# Patient Record
Sex: FEMALE | Race: WHITE
[De-identification: names, ages, dates, MRNs, and addresses within clinical notes are randomized per-mention and may not be internally consistent; named-entity substitution may affect disease eponyms.]

---

## 2019-02-18 ENCOUNTER — HOSPITAL ENCOUNTER (OUTPATIENT)
Age: 74
End: 2019-02-18
Payer: MEDICARE

## 2019-02-18 DIAGNOSIS — N18.9: ICD-10-CM

## 2019-02-18 DIAGNOSIS — M47.897: ICD-10-CM

## 2019-02-18 DIAGNOSIS — J45.909: ICD-10-CM

## 2019-02-18 DIAGNOSIS — I82.503: ICD-10-CM

## 2019-02-18 DIAGNOSIS — F41.9: ICD-10-CM

## 2019-02-18 DIAGNOSIS — E78.5: ICD-10-CM

## 2019-02-18 DIAGNOSIS — J44.9: ICD-10-CM

## 2019-02-18 DIAGNOSIS — L10.9: ICD-10-CM

## 2019-02-18 DIAGNOSIS — E11.9: ICD-10-CM

## 2019-02-18 DIAGNOSIS — I12.9: ICD-10-CM

## 2019-02-18 DIAGNOSIS — K22.70: ICD-10-CM

## 2019-02-18 DIAGNOSIS — E05.90: ICD-10-CM

## 2019-02-18 DIAGNOSIS — M06.09: Primary | ICD-10-CM

## 2019-02-18 DIAGNOSIS — M79.7: ICD-10-CM

## 2019-02-18 LAB
ALANINE AMINOTRANSFER ALT/SGPT: 26 U/L (ref 13–56)
ALBUMIN SERPL-MCNC: 3.2 G/DL (ref 3.2–5)
ALKALINE PHOSPHATASE: 105 U/L (ref 45–117)
ANION GAP: 15 (ref 5–15)
AST(SGOT): 14 U/L (ref 15–37)
BUN SERPL-MCNC: 29 MG/DL (ref 7–18)
BUN/CREAT RATIO: 10.1 RATIO (ref 10–20)
CALCIUM SERPL-MCNC: 9.1 MG/DL (ref 8.5–10.1)
CARBON DIOXIDE: 24 MMOL/L (ref 21–32)
CHLORIDE: 106 MMOL/L (ref 98–107)
CRP SERPL-MCNC: 17 MG/L (ref 0–3)
DEPRECATED RDW RBC: 52.1 FL (ref 35.1–43.9)
DIFFERENTIAL INDICATED: (no result)
ERYTHROCYTE [DISTWIDTH] IN BLOOD: 15.6 % (ref 11.6–14.6)
EST GLOM FILT RATE - AFR AMER: 21 ML/MIN (ref 60–?)
GLOBULIN: 4.5 G/DL (ref 2.2–4.2)
GLUCOSE: 125 MG/DL (ref 74–106)
HCT VFR BLD AUTO: 35.1 % (ref 37–47)
HEMOGLOBIN: 10.6 G/DL (ref 12–15)
HGB BLD-MCNC: 10.6 G/DL (ref 12–15)
IMMATURE GRANULOCYTES COUNT: 0.04 X10^3/UL (ref 0–0)
MCV RBC: 93.4 FL (ref 81–99)
MEAN CORP HGB CONC: 30.2 G/GL (ref 32–36)
MEAN PLATELET VOL.: 10.6 FL (ref 6.2–12)
PLATELET # BLD: 328 K/MM3 (ref 150–450)
PLATELET COUNT: 328 K/MM3 (ref 150–450)
POSITIVE COUNT: NO
POSITIVE DIFFERENTIAL: NO
POSITIVE MORPHOLOGY: NO
POTASSIUM: 3.2 MMOL/L (ref 3.5–5.1)
RBC # BLD AUTO: 3.76 M/MM3 (ref 4.2–5.4)
RBC DISTRIBUTION WIDTH CV: 15.6 % (ref 11.6–14.6)
RBC DISTRIBUTION WIDTH SD: 52.1 FL (ref 35.1–43.9)
WBC # BLD AUTO: 10.7 K/MM3 (ref 4.4–11)
WHITE BLOOD COUNT: 10.7 K/MM3 (ref 4.4–11)

## 2019-02-18 PROCEDURE — 86803 HEPATITIS C AB TEST: CPT

## 2019-02-18 PROCEDURE — 73030 X-RAY EXAM OF SHOULDER: CPT

## 2019-02-18 PROCEDURE — 87340 HEPATITIS B SURFACE AG IA: CPT

## 2019-02-18 PROCEDURE — 86200 CCP ANTIBODY: CPT

## 2019-02-18 PROCEDURE — 86706 HEP B SURFACE ANTIBODY: CPT

## 2019-02-18 PROCEDURE — 85652 RBC SED RATE AUTOMATED: CPT

## 2019-02-18 PROCEDURE — 86431 RHEUMATOID FACTOR QUANT: CPT

## 2019-02-18 PROCEDURE — 80053 COMPREHEN METABOLIC PANEL: CPT

## 2019-02-18 PROCEDURE — 85025 COMPLETE CBC W/AUTO DIFF WBC: CPT

## 2019-02-18 PROCEDURE — 86140 C-REACTIVE PROTEIN: CPT

## 2019-02-18 PROCEDURE — 36415 COLL VENOUS BLD VENIPUNCTURE: CPT

## 2019-02-21 LAB — HEP C ANTIBODIES: <0.1 S/CO RATIO (ref 0–0.9)

## 2023-04-04 ENCOUNTER — TELEPHONE (OUTPATIENT)
Dept: PRIMARY CARE | Facility: CLINIC | Age: 78
End: 2023-04-04
Payer: MEDICARE

## 2023-04-04 DIAGNOSIS — F32.A DEPRESSION, UNSPECIFIED DEPRESSION TYPE: ICD-10-CM

## 2023-04-04 RX ORDER — TRAZODONE HYDROCHLORIDE 100 MG/1
100 TABLET ORAL NIGHTLY
Qty: 90 TABLET | Refills: 3 | Status: SHIPPED | OUTPATIENT
Start: 2023-04-04 | End: 2023-05-08 | Stop reason: SDUPTHER

## 2023-04-04 RX ORDER — TRAZODONE HYDROCHLORIDE 100 MG/1
100 TABLET ORAL NIGHTLY
COMMUNITY
End: 2023-04-04 | Stop reason: SDUPTHER

## 2023-04-04 RX ORDER — ALLOPURINOL 100 MG/1
1 TABLET ORAL DAILY
COMMUNITY
End: 2023-06-06 | Stop reason: SDUPTHER

## 2023-04-06 ENCOUNTER — TELEPHONE (OUTPATIENT)
Dept: PRIMARY CARE | Facility: CLINIC | Age: 78
End: 2023-04-06
Payer: MEDICARE

## 2023-04-06 DIAGNOSIS — I10 HTN (HYPERTENSION), BENIGN: Primary | ICD-10-CM

## 2023-04-06 RX ORDER — ACETAMINOPHEN 500 MG
1 TABLET ORAL DAILY
COMMUNITY

## 2023-04-06 RX ORDER — ALBUTEROL SULFATE 90 UG/1
2 AEROSOL, METERED RESPIRATORY (INHALATION) EVERY 6 HOURS PRN
COMMUNITY

## 2023-04-06 RX ORDER — POTASSIUM CHLORIDE 750 MG/1
10 TABLET, EXTENDED RELEASE ORAL DAILY
COMMUNITY
End: 2023-06-06 | Stop reason: SDUPTHER

## 2023-04-06 RX ORDER — FLUTICASONE PROPIONATE 250 UG/1
1 POWDER, METERED RESPIRATORY (INHALATION) 2 TIMES DAILY
COMMUNITY
Start: 2020-03-20 | End: 2023-06-06 | Stop reason: SDUPTHER

## 2023-04-06 RX ORDER — ASPIRIN 81 MG/1
81 TABLET ORAL DAILY
COMMUNITY

## 2023-04-06 RX ORDER — APIXABAN 2.5 MG/1
2.5 TABLET, FILM COATED ORAL 2 TIMES DAILY
COMMUNITY
End: 2023-06-06 | Stop reason: SDUPTHER

## 2023-04-06 RX ORDER — FERROUS SULFATE 325(65) MG
1 TABLET ORAL DAILY
COMMUNITY
Start: 2016-12-12

## 2023-04-06 RX ORDER — FUROSEMIDE 40 MG/1
40 TABLET ORAL DAILY
Qty: 90 TABLET | Refills: 3 | Status: SHIPPED | OUTPATIENT
Start: 2023-04-06 | End: 2023-06-06 | Stop reason: SDUPTHER

## 2023-04-06 RX ORDER — FUROSEMIDE 40 MG/1
40 TABLET ORAL DAILY
COMMUNITY
Start: 2022-10-03 | End: 2023-04-06 | Stop reason: SDUPTHER

## 2023-04-06 RX ORDER — METOPROLOL SUCCINATE 50 MG/1
50 TABLET, EXTENDED RELEASE ORAL DAILY
COMMUNITY
End: 2023-06-06 | Stop reason: SDUPTHER

## 2023-04-06 RX ORDER — MIRTAZAPINE 45 MG/1
45 TABLET, FILM COATED ORAL NIGHTLY
COMMUNITY
End: 2023-05-08

## 2023-04-06 RX ORDER — METHIMAZOLE 5 MG/1
5 TABLET ORAL DAILY
COMMUNITY
End: 2023-06-06 | Stop reason: SDUPTHER

## 2023-04-06 RX ORDER — ROSUVASTATIN CALCIUM 20 MG/1
20 TABLET, COATED ORAL NIGHTLY
COMMUNITY
End: 2023-06-06 | Stop reason: SDUPTHER

## 2023-04-06 RX ORDER — BETAMETHASONE DIPROPIONATE 0.5 MG/G
0.05 OINTMENT, AUGMENTED TOPICAL 2 TIMES DAILY
COMMUNITY
Start: 2020-10-15

## 2023-04-06 NOTE — TELEPHONE ENCOUNTER
Patient  called wants to know why  she is taking asa 81 mg and eleiquis ?also was in the hospital in July and never followed up with us.  While in hospital  lasix was decreased to 40mg.  Every day.;

## 2023-04-10 LAB
ALBUMIN (G/DL) IN SER/PLAS: 3.2 G/DL (ref 3.4–5)
ANION GAP IN SER/PLAS: 15 MMOL/L (ref 10–20)
CALCIUM (MG/DL) IN SER/PLAS: 8.9 MG/DL (ref 8.6–10.3)
CARBON DIOXIDE, TOTAL (MMOL/L) IN SER/PLAS: 23 MMOL/L (ref 21–32)
CHLORIDE (MMOL/L) IN SER/PLAS: 107 MMOL/L (ref 98–107)
CREATININE (MG/DL) IN SER/PLAS: 2.51 MG/DL (ref 0.5–1.05)
CREATININE (MG/DL) IN URINE: 64 MG/DL (ref 20–320)
GFR FEMALE: 19 ML/MIN/1.73M2
GLUCOSE (MG/DL) IN SER/PLAS: 109 MG/DL (ref 74–99)
HEMATOCRIT (%) IN BLOOD BY AUTOMATED COUNT: 28.1 % (ref 36–46)
HEMOGLOBIN (G/DL) IN BLOOD: 8.4 G/DL (ref 12–16)
PHOSPHATE (MG/DL) IN SER/PLAS: 4.2 MG/DL (ref 2.5–4.9)
POTASSIUM (MMOL/L) IN SER/PLAS: 3.8 MMOL/L (ref 3.5–5.3)
PROTEIN (MG/DL) IN URINE: 20 MG/DL (ref 5–24)
PROTEIN/CREATININE (MG/MG) IN URINE: 0.31 MG/MG CREAT (ref 0–0.17)
SODIUM (MMOL/L) IN SER/PLAS: 141 MMOL/L (ref 136–145)
URATE (MG/DL) IN SER/PLAS: 6.6 MG/DL (ref 2.3–6.7)
UREA NITROGEN (MG/DL) IN SER/PLAS: 37 MG/DL (ref 6–23)

## 2023-04-11 LAB — PARATHYRIN INTACT (PG/ML) IN SER/PLAS: 139.2 PG/ML (ref 18.5–88)

## 2023-05-08 ENCOUNTER — TELEPHONE (OUTPATIENT)
Dept: PRIMARY CARE | Facility: CLINIC | Age: 78
End: 2023-05-08

## 2023-05-08 ENCOUNTER — OFFICE VISIT (OUTPATIENT)
Dept: PRIMARY CARE | Facility: CLINIC | Age: 78
End: 2023-05-08
Payer: MEDICARE

## 2023-05-08 VITALS
OXYGEN SATURATION: 90 % | HEIGHT: 65 IN | WEIGHT: 175.2 LBS | BODY MASS INDEX: 29.19 KG/M2 | HEART RATE: 91 BPM | DIASTOLIC BLOOD PRESSURE: 58 MMHG | SYSTOLIC BLOOD PRESSURE: 120 MMHG

## 2023-05-08 DIAGNOSIS — J44.89 COPD WITH ASTHMA (MULTI): ICD-10-CM

## 2023-05-08 DIAGNOSIS — M10.9 GOUT, UNSPECIFIED CAUSE, UNSPECIFIED CHRONICITY, UNSPECIFIED SITE: ICD-10-CM

## 2023-05-08 DIAGNOSIS — Z00.00 ROUTINE GENERAL MEDICAL EXAMINATION AT HEALTH CARE FACILITY: Primary | ICD-10-CM

## 2023-05-08 DIAGNOSIS — I26.99 PULMONARY EMBOLISM, UNSPECIFIED CHRONICITY, UNSPECIFIED PULMONARY EMBOLISM TYPE, UNSPECIFIED WHETHER ACUTE COR PULMONALE PRESENT (MULTI): ICD-10-CM

## 2023-05-08 DIAGNOSIS — F32.A DEPRESSION, UNSPECIFIED DEPRESSION TYPE: ICD-10-CM

## 2023-05-08 DIAGNOSIS — E05.90 HYPERTHYROIDISM: ICD-10-CM

## 2023-05-08 DIAGNOSIS — I82.5Y2 CHRONIC DEEP VEIN THROMBOSIS (DVT) OF PROXIMAL VEIN OF LEFT LOWER EXTREMITY (MULTI): ICD-10-CM

## 2023-05-08 DIAGNOSIS — F51.01 PRIMARY INSOMNIA: ICD-10-CM

## 2023-05-08 DIAGNOSIS — D64.9 ANEMIA, UNSPECIFIED TYPE: ICD-10-CM

## 2023-05-08 PROBLEM — Z95.828 S/P INSERTION OF IVC (INFERIOR VENA CAVAL) FILTER: Status: ACTIVE | Noted: 2023-05-08

## 2023-05-08 PROBLEM — R91.1 LUNG NODULE: Status: ACTIVE | Noted: 2023-05-08

## 2023-05-08 PROBLEM — I82.409 DVT, LOWER EXTREMITY (MULTI): Status: ACTIVE | Noted: 2023-05-08

## 2023-05-08 PROBLEM — E11.22 TYPE 2 DIABETES MELLITUS WITH STAGE 4 CHRONIC KIDNEY DISEASE, WITHOUT LONG-TERM CURRENT USE OF INSULIN (MULTI): Status: RESOLVED | Noted: 2019-06-20 | Resolved: 2023-05-08

## 2023-05-08 PROBLEM — E78.00 HYPERCHOLESTEROLEMIA: Status: ACTIVE | Noted: 2023-05-08

## 2023-05-08 PROBLEM — K44.9 HIATAL HERNIA: Status: ACTIVE | Noted: 2023-05-08

## 2023-05-08 PROBLEM — G47.00 INSOMNIA: Status: ACTIVE | Noted: 2023-05-08

## 2023-05-08 PROBLEM — R10.11 RIGHT UPPER QUADRANT ABDOMINAL PAIN: Status: ACTIVE | Noted: 2023-05-08

## 2023-05-08 PROBLEM — N18.4 STAGE 4 CHRONIC KIDNEY DISEASE (MULTI): Status: ACTIVE | Noted: 2023-05-08

## 2023-05-08 PROBLEM — I10 HTN (HYPERTENSION): Status: ACTIVE | Noted: 2023-05-08

## 2023-05-08 PROBLEM — L10.9 PEMPHIGUS (MULTI): Status: ACTIVE | Noted: 2023-05-08

## 2023-05-08 PROBLEM — E11.22 TYPE 2 DIABETES MELLITUS WITH STAGE 4 CHRONIC KIDNEY DISEASE, WITHOUT LONG-TERM CURRENT USE OF INSULIN (MULTI): Status: ACTIVE | Noted: 2019-06-20

## 2023-05-08 PROBLEM — A41.9 SEPSIS (MULTI): Status: RESOLVED | Noted: 2023-05-08 | Resolved: 2023-05-08

## 2023-05-08 PROBLEM — L40.9 PSORIASIS: Status: ACTIVE | Noted: 2023-05-08

## 2023-05-08 PROBLEM — R06.09 DOE (DYSPNEA ON EXERTION): Status: ACTIVE | Noted: 2018-10-04

## 2023-05-08 PROBLEM — N18.4 TYPE 2 DIABETES MELLITUS WITH STAGE 4 CHRONIC KIDNEY DISEASE, WITHOUT LONG-TERM CURRENT USE OF INSULIN (MULTI): Status: RESOLVED | Noted: 2019-06-20 | Resolved: 2023-05-08

## 2023-05-08 PROBLEM — K22.70 BARRETT'S ESOPHAGUS: Status: ACTIVE | Noted: 2023-05-08

## 2023-05-08 PROBLEM — F41.1 GENERALIZED ANXIETY DISORDER: Status: ACTIVE | Noted: 2023-05-08

## 2023-05-08 PROBLEM — K21.9 ACID REFLUX: Status: ACTIVE | Noted: 2023-05-08

## 2023-05-08 PROBLEM — A41.9 SEPSIS (MULTI): Status: ACTIVE | Noted: 2023-05-08

## 2023-05-08 PROBLEM — Z86.718 PERSONAL HISTORY OF DVT (DEEP VEIN THROMBOSIS): Status: ACTIVE | Noted: 2019-06-20

## 2023-05-08 PROBLEM — E66.9 OBESITY (BMI 30-39.9): Status: ACTIVE | Noted: 2019-06-21

## 2023-05-08 PROBLEM — J45.909 ASTHMA (HHS-HCC): Status: ACTIVE | Noted: 2023-05-08

## 2023-05-08 PROBLEM — I10 BENIGN ESSENTIAL HTN: Status: ACTIVE | Noted: 2023-05-08

## 2023-05-08 PROBLEM — N18.4 TYPE 2 DIABETES MELLITUS WITH STAGE 4 CHRONIC KIDNEY DISEASE, WITHOUT LONG-TERM CURRENT USE OF INSULIN (MULTI): Status: ACTIVE | Noted: 2019-06-20

## 2023-05-08 PROCEDURE — 99214 OFFICE O/P EST MOD 30 MIN: CPT | Performed by: FAMILY MEDICINE

## 2023-05-08 PROCEDURE — 3074F SYST BP LT 130 MM HG: CPT | Performed by: FAMILY MEDICINE

## 2023-05-08 PROCEDURE — 1159F MED LIST DOCD IN RCRD: CPT | Performed by: FAMILY MEDICINE

## 2023-05-08 PROCEDURE — G0439 PPPS, SUBSEQ VISIT: HCPCS | Performed by: FAMILY MEDICINE

## 2023-05-08 PROCEDURE — 3078F DIAST BP <80 MM HG: CPT | Performed by: FAMILY MEDICINE

## 2023-05-08 PROCEDURE — 1160F RVW MEDS BY RX/DR IN RCRD: CPT | Performed by: FAMILY MEDICINE

## 2023-05-08 PROCEDURE — 1170F FXNL STATUS ASSESSED: CPT | Performed by: FAMILY MEDICINE

## 2023-05-08 PROCEDURE — 1036F TOBACCO NON-USER: CPT | Performed by: FAMILY MEDICINE

## 2023-05-08 RX ORDER — TRAZODONE HYDROCHLORIDE 100 MG/1
200 TABLET ORAL NIGHTLY
Qty: 180 TABLET | Refills: 3 | Status: SHIPPED | OUTPATIENT
Start: 2023-05-08 | End: 2024-06-07 | Stop reason: SDUPTHER

## 2023-05-08 ASSESSMENT — ACTIVITIES OF DAILY LIVING (ADL)
GROCERY_SHOPPING: INDEPENDENT
TAKING_MEDICATION: INDEPENDENT
DRESSING: INDEPENDENT
DOING_HOUSEWORK: INDEPENDENT
BATHING: INDEPENDENT
MANAGING_FINANCES: INDEPENDENT

## 2023-05-08 ASSESSMENT — PATIENT HEALTH QUESTIONNAIRE - PHQ9
2. FEELING DOWN, DEPRESSED OR HOPELESS: NOT AT ALL
1. LITTLE INTEREST OR PLEASURE IN DOING THINGS: NOT AT ALL
SUM OF ALL RESPONSES TO PHQ9 QUESTIONS 1 AND 2: 0

## 2023-05-08 ASSESSMENT — ENCOUNTER SYMPTOMS
OCCASIONAL FEELINGS OF UNSTEADINESS: 0
LOSS OF SENSATION IN FEET: 0
DEPRESSION: 0

## 2023-05-08 NOTE — PROGRESS NOTES
"Subjective   Reason for Visit: Hailee David is an 78 y.o. female here for a Medicare Wellness visit.     Past Medical, Surgical, and Family History reviewed and updated in chart.    Reviewed all medications by prescribing practitioner or clinical pharmacist (such as prescriptions, OTCs, herbal therapies and supplements) and documented in the medical record.    HPI  I insomnia  is not sleeping wellon both remeron and mirtaz reviewed that she does not have any urinary symptoms or bone pains that keep her up at night but is the brain will not shut off.  Reviewed this is anxiety.  She has been on mirtazapine 45 and trazodone 100.  She states she still stays up most nights.  We will discontinue mirtazapine and increase trazodone to 200 mg daily  Copd. No flovent use, prn albuterol 4-5x a month.  No exacerbations since last visit  Renal recc I refer to heme onc for persisting anemia.  While I do not think this is an anemia of chronic disease or due to renal disease will have to be evaluated by heme-onc.  Reviewed labs from last year showing a B12 at the lower end of normal hemoglobin 8.4 B12 280 haptoglobin 369 ferritin 197.  Transferrin 226 iron 23 TIBC 143% sat 16  hyperthyroid with most recent TSH showing 0.14 and free thyroxine of slightly elevated 1.2.  We will recheck this at this time.  Some methimazole 5 mg daily  History of DVT with a filter and PE maintain an anticoagulation  Gout- no interval exacerbations, continues on prophylactic therapy without side effects.  Hyperlipidemia- is on a statin and a prudent diet.    Patient Care Team:  Devon Lewis MD as PCP - General  Devon Lewis MD as PCP - [employer] PCP     Review of Systems as per HPI    Objective   Vitals:  /58   Pulse 91   Ht 1.651 m (5' 5\")   Wt 79.5 kg (175 lb 3.2 oz)   SpO2 90%   BMI 29.15 kg/m²       Physical Exam  General:  Alert, No acute distress. Appears stated age  Eye:  Pupils are equal, round and reactive " to light, Extraocular movements are intact, Normal conjunctiva.    Neck:  Supple, Non-tender, No carotid bruit, No jugular venous distention, No lymphadenopathy, No thyromegaly.    Respiratory:  Lungs are clear to auscultation, Respirations are non-labored, Breath sounds are equal.    Cardiovascular:  Normal rate, Regular rhythm, No murmur.    Gastrointestinal:  Soft, Non-tender, No organomegaly. No solid or pulsatile mass  Integumentary:  Warm, Dry. No concerning lesions on exposed areas  Neurologic:  Alert, Oriented.  Gross and fine motor intact, CN 2-12 intact  Psychiatric:  Cooperative, Appropriate mood & affect.  Assessment/Plan   Problem List Items Addressed This Visit          Nervous    Insomnia    Relevant Medications    traZODone (Desyrel) 100 mg tablet    Other Relevant Orders    Follow Up In Primary Care       Respiratory    COPD with asthma (CMS/HCC)    Relevant Orders    Follow Up In Primary Care       Circulatory    DVT, lower extremity (CMS/HCC)    Relevant Orders    Follow Up In Primary Care    Pulmonary embolism (CMS/HCC)    Relevant Orders    Follow Up In Primary Care       Endocrine/Metabolic    Hyperthyroidism    Relevant Orders    Thyroxine, Free    Triiodothyronine, Free    Thyroid Stimulating Hormone    Thyroid Peroxidase (TPO) Antibody       Hematologic    Anemia    Relevant Orders    Referral to Hematology    Follow Up In Primary Care       Other    Depression    Relevant Medications    traZODone (Desyrel) 100 mg tablet    Other Relevant Orders    Follow Up In Primary Care    Gout     Other Visit Diagnoses       Routine general medical examination at health care facility    -  Primary

## 2023-06-06 DIAGNOSIS — I25.10 CORONARY ARTERY DISEASE, UNSPECIFIED VESSEL OR LESION TYPE, UNSPECIFIED WHETHER ANGINA PRESENT, UNSPECIFIED WHETHER NATIVE OR TRANSPLANTED HEART: ICD-10-CM

## 2023-06-06 DIAGNOSIS — M10.9 GOUT, UNSPECIFIED CAUSE, UNSPECIFIED CHRONICITY, UNSPECIFIED SITE: ICD-10-CM

## 2023-06-06 DIAGNOSIS — J44.89 COPD WITH ASTHMA (MULTI): ICD-10-CM

## 2023-06-06 DIAGNOSIS — I82.5Y2 CHRONIC DEEP VEIN THROMBOSIS (DVT) OF PROXIMAL VEIN OF LEFT LOWER EXTREMITY (MULTI): ICD-10-CM

## 2023-06-06 DIAGNOSIS — I10 HTN (HYPERTENSION), BENIGN: ICD-10-CM

## 2023-06-06 DIAGNOSIS — E05.90 HYPERTHYROIDISM: ICD-10-CM

## 2023-06-06 RX ORDER — FLUTICASONE PROPIONATE 250 UG/1
1 POWDER, METERED RESPIRATORY (INHALATION)
Qty: 60 EACH | Refills: 3 | Status: SHIPPED | OUTPATIENT
Start: 2023-06-06

## 2023-06-06 RX ORDER — POTASSIUM CHLORIDE 750 MG/1
10 TABLET, EXTENDED RELEASE ORAL DAILY
Qty: 90 TABLET | Refills: 3 | Status: SHIPPED | OUTPATIENT
Start: 2023-06-06 | End: 2024-02-26 | Stop reason: SDUPTHER

## 2023-06-06 RX ORDER — FUROSEMIDE 40 MG/1
40 TABLET ORAL DAILY
Qty: 90 TABLET | Refills: 3 | Status: SHIPPED | OUTPATIENT
Start: 2023-06-06 | End: 2024-06-07 | Stop reason: SDUPTHER

## 2023-06-06 RX ORDER — ROSUVASTATIN CALCIUM 20 MG/1
20 TABLET, COATED ORAL NIGHTLY
Qty: 90 TABLET | Refills: 3 | Status: SHIPPED | OUTPATIENT
Start: 2023-06-06 | End: 2024-02-26 | Stop reason: SDUPTHER

## 2023-06-06 RX ORDER — ALLOPURINOL 100 MG/1
100 TABLET ORAL DAILY
Qty: 90 TABLET | Refills: 3 | Status: SHIPPED | OUTPATIENT
Start: 2023-06-06 | End: 2024-02-26 | Stop reason: SDUPTHER

## 2023-06-06 RX ORDER — APIXABAN 2.5 MG/1
2.5 TABLET, FILM COATED ORAL 2 TIMES DAILY
Qty: 180 TABLET | Refills: 3 | Status: SHIPPED | OUTPATIENT
Start: 2023-06-06 | End: 2023-10-02 | Stop reason: SDUPTHER

## 2023-06-06 RX ORDER — METHIMAZOLE 5 MG/1
5 TABLET ORAL DAILY
Qty: 90 TABLET | Refills: 3 | Status: SHIPPED | OUTPATIENT
Start: 2023-06-06 | End: 2024-02-26 | Stop reason: SDUPTHER

## 2023-06-06 RX ORDER — METOPROLOL SUCCINATE 50 MG/1
50 TABLET, EXTENDED RELEASE ORAL DAILY
Qty: 90 TABLET | Refills: 3 | Status: SHIPPED | OUTPATIENT
Start: 2023-06-06 | End: 2024-02-26 | Stop reason: SDUPTHER

## 2023-08-15 ENCOUNTER — APPOINTMENT (OUTPATIENT)
Dept: PRIMARY CARE | Facility: CLINIC | Age: 78
End: 2023-08-15
Payer: MEDICARE

## 2023-08-22 ENCOUNTER — LAB (OUTPATIENT)
Dept: LAB | Facility: LAB | Age: 78
End: 2023-08-22
Payer: MEDICARE

## 2023-08-22 DIAGNOSIS — E05.90 HYPERTHYROIDISM: ICD-10-CM

## 2023-08-22 LAB
ALANINE AMINOTRANSFERASE (SGPT) (U/L) IN SER/PLAS: 6 U/L (ref 7–45)
ALBUMIN (G/DL) IN SER/PLAS: 3.1 G/DL (ref 3.4–5)
ALKALINE PHOSPHATASE (U/L) IN SER/PLAS: 84 U/L (ref 33–136)
ANION GAP IN SER/PLAS: 16 MMOL/L (ref 10–20)
ASPARTATE AMINOTRANSFERASE (SGOT) (U/L) IN SER/PLAS: 11 U/L (ref 9–39)
BILIRUBIN TOTAL (MG/DL) IN SER/PLAS: 0.3 MG/DL (ref 0–1.2)
CALCIUM (MG/DL) IN SER/PLAS: 8.6 MG/DL (ref 8.6–10.3)
CARBON DIOXIDE, TOTAL (MMOL/L) IN SER/PLAS: 23 MMOL/L (ref 21–32)
CHLORIDE (MMOL/L) IN SER/PLAS: 105 MMOL/L (ref 98–107)
CREATININE (MG/DL) IN SER/PLAS: 2.24 MG/DL (ref 0.5–1.05)
CREATININE (MG/DL) IN URINE: 133 MG/DL (ref 20–320)
GFR FEMALE: 22 ML/MIN/1.73M2
GLUCOSE (MG/DL) IN SER/PLAS: 131 MG/DL (ref 74–99)
HEMATOCRIT (%) IN BLOOD BY AUTOMATED COUNT: 28.1 % (ref 36–46)
HEMOGLOBIN (G/DL) IN BLOOD: 8.5 G/DL (ref 12–16)
POTASSIUM (MMOL/L) IN SER/PLAS: 3.7 MMOL/L (ref 3.5–5.3)
PROTEIN (MG/DL) IN URINE: 56 MG/DL (ref 5–24)
PROTEIN TOTAL: 6.9 G/DL (ref 6.4–8.2)
PROTEIN/CREATININE (MG/MG) IN URINE: 0.42 MG/MG CREAT (ref 0–0.17)
SODIUM (MMOL/L) IN SER/PLAS: 140 MMOL/L (ref 136–145)
THYROTROPIN (MIU/L) IN SER/PLAS BY DETECTION LIMIT <= 0.05 MIU/L: 0.98 MIU/L (ref 0.44–3.98)
THYROXINE (T4) FREE (NG/DL) IN SER/PLAS: 0.98 NG/DL (ref 0.61–1.12)
URATE (MG/DL) IN SER/PLAS: 6.6 MG/DL (ref 2.3–6.7)
UREA NITROGEN (MG/DL) IN SER/PLAS: 20 MG/DL (ref 6–23)

## 2023-08-22 PROCEDURE — 84439 ASSAY OF FREE THYROXINE: CPT

## 2023-08-22 PROCEDURE — 84481 FREE ASSAY (FT-3): CPT

## 2023-08-22 PROCEDURE — 36415 COLL VENOUS BLD VENIPUNCTURE: CPT

## 2023-08-22 PROCEDURE — 84443 ASSAY THYROID STIM HORMONE: CPT

## 2023-08-22 PROCEDURE — 86376 MICROSOMAL ANTIBODY EACH: CPT

## 2023-08-23 LAB
PARATHYRIN INTACT (PG/ML) IN SER/PLAS: 130.1 PG/ML (ref 18.5–88)
THYROPEROXIDASE AB (IU/ML) IN SER/PLAS: 38 IU/ML
TRIIODOTHYRONINE (T3) FREE (PG/ML) IN SER/PLAS: 2.7 PG/ML (ref 2.3–4.2)

## 2023-09-18 ENCOUNTER — OFFICE VISIT (OUTPATIENT)
Dept: PRIMARY CARE | Facility: CLINIC | Age: 78
End: 2023-09-18
Payer: MEDICARE

## 2023-09-18 VITALS
DIASTOLIC BLOOD PRESSURE: 68 MMHG | HEART RATE: 96 BPM | BODY MASS INDEX: 26.61 KG/M2 | HEIGHT: 65 IN | SYSTOLIC BLOOD PRESSURE: 138 MMHG | WEIGHT: 159.7 LBS | OXYGEN SATURATION: 98 %

## 2023-09-18 DIAGNOSIS — I10 PRIMARY HYPERTENSION: ICD-10-CM

## 2023-09-18 DIAGNOSIS — M10.9 GOUT, UNSPECIFIED CAUSE, UNSPECIFIED CHRONICITY, UNSPECIFIED SITE: Primary | ICD-10-CM

## 2023-09-18 DIAGNOSIS — E05.90 HYPERTHYROIDISM: ICD-10-CM

## 2023-09-18 DIAGNOSIS — F41.1 GENERALIZED ANXIETY DISORDER: ICD-10-CM

## 2023-09-18 DIAGNOSIS — J44.89 COPD WITH ASTHMA (MULTI): ICD-10-CM

## 2023-09-18 DIAGNOSIS — F32.5 MAJOR DEPRESSIVE DISORDER IN FULL REMISSION, UNSPECIFIED WHETHER RECURRENT (CMS-HCC): ICD-10-CM

## 2023-09-18 DIAGNOSIS — F51.01 PRIMARY INSOMNIA: ICD-10-CM

## 2023-09-18 DIAGNOSIS — K21.9 GASTROESOPHAGEAL REFLUX DISEASE WITHOUT ESOPHAGITIS: ICD-10-CM

## 2023-09-18 PROCEDURE — 3078F DIAST BP <80 MM HG: CPT | Performed by: FAMILY MEDICINE

## 2023-09-18 PROCEDURE — 3075F SYST BP GE 130 - 139MM HG: CPT | Performed by: FAMILY MEDICINE

## 2023-09-18 PROCEDURE — 1160F RVW MEDS BY RX/DR IN RCRD: CPT | Performed by: FAMILY MEDICINE

## 2023-09-18 PROCEDURE — 1159F MED LIST DOCD IN RCRD: CPT | Performed by: FAMILY MEDICINE

## 2023-09-18 PROCEDURE — 1036F TOBACCO NON-USER: CPT | Performed by: FAMILY MEDICINE

## 2023-09-18 PROCEDURE — 99214 OFFICE O/P EST MOD 30 MIN: CPT | Performed by: FAMILY MEDICINE

## 2023-09-18 RX ORDER — CALCITRIOL 0.25 UG/1
0.25 CAPSULE ORAL DAILY
COMMUNITY
Start: 2023-09-04

## 2023-09-18 RX ORDER — PANTOPRAZOLE SODIUM 20 MG/1
20 TABLET, DELAYED RELEASE ORAL DAILY
COMMUNITY
Start: 2023-09-04 | End: 2023-10-02 | Stop reason: SDUPTHER

## 2023-09-18 NOTE — PROGRESS NOTES
"Subjective   Patient ID: Hailee David is a 78 y.o. female who presents for Follow-up (3 mo).    HPI   Since the last office visit there have been no interval operations, hospitalizations, important illnesses or injuries.  Gout- no interval exacerbations, continues on prophylactic therapy without side effects.  Hyperlipidemia- is on a statin and a prudent diet.  copd- no exacerbations since last ov. Albuterol 2x month , nno flovoent in months  GERD-Takes PPI daily with no breakthrough symptoms.  Reviewed dietary, caffeine, tobacco, alcohol, and NSAID avoidance. No dyspepsia, dysphagia, reflux, melena, or abdominal pain.  Insomnia -went backto 100as side effects at 200  One trip to er for heart jumping ojut of chest  Sees dr beasley for renal  Missed appt [facility], records show ?[facility]  Anx/dep- on trazodone. No breakthru symptoms  On methimisole for hyper thy and at goal  Review of Systems  General-no fatigue weight to within 10 pounds  ENT no problems with vision swallowing  Cardiac no chest pains palpitations change in exercise tolerance or capacity  Pulmonary no cough shortness of breath  GI no heartburn or abdominal pain  Musculoskeletal no joint pains  Objective   /68   Pulse 96   Ht 1.651 m (5' 5\")   Wt 72.4 kg (159 lb 11.2 oz)   SpO2 98%   BMI 26.58 kg/m²     Physical Exam  General:  Alert, No acute distress. Appears stated age  Eye:  Pupils are equal, round and reactive to light, Extraocular movements are intact, Normal conjunctiva.    Neck:  Supple, Non-tender, No carotid bruit, No jugular venous distention, No lymphadenopathy, No thyromegaly.    Respiratory:  Lungs are clear to auscultation, Respirations are non-labored, Breath sounds are equal.    Cardiovascular:  Normal rate, Regular rhythm, No murmur.    Gastrointestinal:  Soft, Non-tender, No organomegaly. No solid or pulsatile mass  Integumentary:  Warm, Dry. No concerning lesions on exposed areas  Neurologic:  Alert, Oriented.  Gross and fine " motor intact, CN 2-12 intact  Psychiatric:  Cooperative, Appropriate mood & affect.  Assessment/Plan   Problem List Items Addressed This Visit       Acid reflux    Relevant Orders    Follow Up In Primary Care    COPD with asthma (CMS/HCC)    Relevant Orders    Follow Up In Primary Care    Depression    Relevant Orders    Follow Up In Primary Care    Generalized anxiety disorder    Relevant Orders    Follow Up In Primary Care    Gout - Primary    Relevant Orders    Follow Up In Primary Care    HTN (hypertension)    Relevant Orders    Follow Up In Primary Care    CBC    Comprehensive Metabolic Panel    Lipid Panel    Hyperthyroidism    Relevant Orders    Follow Up In Primary Care    CBC    Comprehensive Metabolic Panel    Lipid Panel    Thyroid Stimulating Hormone    Insomnia    Relevant Orders    Follow Up In Primary Care

## 2023-10-02 DIAGNOSIS — K21.9 GASTROESOPHAGEAL REFLUX DISEASE WITHOUT ESOPHAGITIS: ICD-10-CM

## 2023-10-02 DIAGNOSIS — I82.5Y2 CHRONIC DEEP VEIN THROMBOSIS (DVT) OF PROXIMAL VEIN OF LEFT LOWER EXTREMITY (MULTI): ICD-10-CM

## 2023-10-02 RX ORDER — PANTOPRAZOLE SODIUM 20 MG/1
20 TABLET, DELAYED RELEASE ORAL DAILY
Qty: 90 TABLET | Refills: 3 | Status: SHIPPED | OUTPATIENT
Start: 2023-10-02 | End: 2024-10-01

## 2023-10-02 RX ORDER — APIXABAN 2.5 MG/1
2.5 TABLET, FILM COATED ORAL 2 TIMES DAILY
Qty: 180 TABLET | Refills: 3 | Status: SHIPPED | OUTPATIENT
Start: 2023-10-02

## 2023-11-29 DIAGNOSIS — N18.4 CHRONIC KIDNEY DISEASE, STAGE 4 (SEVERE) (MULTI): Primary | ICD-10-CM

## 2023-12-21 DIAGNOSIS — J44.89 COPD WITH ASTHMA (MULTI): ICD-10-CM

## 2024-02-26 DIAGNOSIS — I25.10 CORONARY ARTERY DISEASE, UNSPECIFIED VESSEL OR LESION TYPE, UNSPECIFIED WHETHER ANGINA PRESENT, UNSPECIFIED WHETHER NATIVE OR TRANSPLANTED HEART: ICD-10-CM

## 2024-02-26 DIAGNOSIS — E05.90 HYPERTHYROIDISM: ICD-10-CM

## 2024-02-26 DIAGNOSIS — I10 HTN (HYPERTENSION), BENIGN: ICD-10-CM

## 2024-02-26 DIAGNOSIS — M10.9 GOUT, UNSPECIFIED CAUSE, UNSPECIFIED CHRONICITY, UNSPECIFIED SITE: ICD-10-CM

## 2024-02-26 RX ORDER — POTASSIUM CHLORIDE 750 MG/1
10 TABLET, FILM COATED, EXTENDED RELEASE ORAL DAILY
Qty: 90 TABLET | Refills: 3 | Status: SHIPPED | OUTPATIENT
Start: 2024-02-26

## 2024-02-26 RX ORDER — METHIMAZOLE 5 MG/1
5 TABLET ORAL DAILY
Qty: 90 TABLET | Refills: 3 | Status: SHIPPED | OUTPATIENT
Start: 2024-02-26

## 2024-02-26 RX ORDER — ALLOPURINOL 100 MG/1
100 TABLET ORAL DAILY
Qty: 90 TABLET | Refills: 3 | Status: SHIPPED | OUTPATIENT
Start: 2024-02-26

## 2024-02-26 RX ORDER — ROSUVASTATIN CALCIUM 20 MG/1
20 TABLET, COATED ORAL NIGHTLY
Qty: 90 TABLET | Refills: 3 | Status: SHIPPED | OUTPATIENT
Start: 2024-02-26

## 2024-02-26 RX ORDER — METOPROLOL SUCCINATE 50 MG/1
50 TABLET, EXTENDED RELEASE ORAL DAILY
Qty: 90 TABLET | Refills: 3 | Status: SHIPPED | OUTPATIENT
Start: 2024-02-26

## 2024-03-13 ENCOUNTER — LAB (OUTPATIENT)
Dept: LAB | Facility: LAB | Age: 79
End: 2024-03-13
Payer: MEDICARE

## 2024-03-13 DIAGNOSIS — I10 PRIMARY HYPERTENSION: ICD-10-CM

## 2024-03-13 DIAGNOSIS — E05.90 HYPERTHYROIDISM: ICD-10-CM

## 2024-03-13 DIAGNOSIS — N18.4 CHRONIC KIDNEY DISEASE, STAGE 4 (SEVERE) (MULTI): ICD-10-CM

## 2024-03-13 LAB
ALBUMIN SERPL BCP-MCNC: 3.3 G/DL (ref 3.4–5)
ALP SERPL-CCNC: 79 U/L (ref 33–136)
ALT SERPL W P-5'-P-CCNC: 6 U/L (ref 7–45)
ANION GAP SERPL CALC-SCNC: 16 MMOL/L (ref 10–20)
AST SERPL W P-5'-P-CCNC: 14 U/L (ref 9–39)
BILIRUB SERPL-MCNC: 0.3 MG/DL (ref 0–1.2)
BUN SERPL-MCNC: 38 MG/DL (ref 6–23)
CALCIUM SERPL-MCNC: 8.9 MG/DL (ref 8.6–10.3)
CHLORIDE SERPL-SCNC: 104 MMOL/L (ref 98–107)
CO2 SERPL-SCNC: 25 MMOL/L (ref 21–32)
CREAT SERPL-MCNC: 2.33 MG/DL (ref 0.5–1.05)
EGFRCR SERPLBLD CKD-EPI 2021: 21 ML/MIN/1.73M*2
ERYTHROCYTE [DISTWIDTH] IN BLOOD BY AUTOMATED COUNT: 13.9 % (ref 11.5–14.5)
GLUCOSE SERPL-MCNC: 84 MG/DL (ref 74–99)
HCT VFR BLD AUTO: 28.6 % (ref 36–46)
HGB BLD-MCNC: 8.9 G/DL (ref 12–16)
MCH RBC QN AUTO: 27.2 PG (ref 26–34)
MCHC RBC AUTO-ENTMCNC: 31.1 G/DL (ref 32–36)
MCV RBC AUTO: 88 FL (ref 80–100)
NRBC BLD-RTO: 0 /100 WBCS (ref 0–0)
PLATELET # BLD AUTO: 280 X10*3/UL (ref 150–450)
POTASSIUM SERPL-SCNC: 4.1 MMOL/L (ref 3.5–5.3)
PROT SERPL-MCNC: 7 G/DL (ref 6.4–8.2)
RBC # BLD AUTO: 3.27 X10*6/UL (ref 4–5.2)
SODIUM SERPL-SCNC: 141 MMOL/L (ref 136–145)
TSH SERPL-ACNC: 1.51 MIU/L (ref 0.44–3.98)
URATE SERPL-MCNC: 6.2 MG/DL (ref 2.3–6.7)
WBC # BLD AUTO: 6.7 X10*3/UL (ref 4.4–11.3)

## 2024-03-13 PROCEDURE — 84443 ASSAY THYROID STIM HORMONE: CPT

## 2024-03-13 PROCEDURE — 83970 ASSAY OF PARATHORMONE: CPT

## 2024-03-13 PROCEDURE — 80053 COMPREHEN METABOLIC PANEL: CPT

## 2024-03-13 PROCEDURE — 36415 COLL VENOUS BLD VENIPUNCTURE: CPT

## 2024-03-13 PROCEDURE — 84550 ASSAY OF BLOOD/URIC ACID: CPT

## 2024-03-13 PROCEDURE — 85027 COMPLETE CBC AUTOMATED: CPT

## 2024-03-14 ENCOUNTER — LAB (OUTPATIENT)
Dept: LAB | Facility: LAB | Age: 79
End: 2024-03-14
Payer: MEDICARE

## 2024-03-14 DIAGNOSIS — I10 PRIMARY HYPERTENSION: ICD-10-CM

## 2024-03-14 DIAGNOSIS — E05.90 HYPERTHYROIDISM: ICD-10-CM

## 2024-03-14 DIAGNOSIS — D47.2 GAMMOPATHY: ICD-10-CM

## 2024-03-14 LAB
CHOLEST SERPL-MCNC: 136 MG/DL (ref 0–199)
CHOLESTEROL/HDL RATIO: 3
HDLC SERPL-MCNC: 46 MG/DL
LDLC SERPL CALC-MCNC: 69 MG/DL
NON HDL CHOLESTEROL: 90 MG/DL (ref 0–149)
PTH-INTACT SERPL-MCNC: 104.9 PG/ML (ref 18.5–88)
TRIGL SERPL-MCNC: 106 MG/DL (ref 0–149)
VLDL: 21 MG/DL (ref 0–40)

## 2024-03-14 PROCEDURE — 84165 PROTEIN E-PHORESIS SERUM: CPT

## 2024-03-14 PROCEDURE — 36415 COLL VENOUS BLD VENIPUNCTURE: CPT

## 2024-03-14 PROCEDURE — 80061 LIPID PANEL: CPT

## 2024-03-14 PROCEDURE — 84165 PROTEIN E-PHORESIS SERUM: CPT | Performed by: FAMILY MEDICINE

## 2024-03-18 ENCOUNTER — OFFICE VISIT (OUTPATIENT)
Dept: PRIMARY CARE | Facility: CLINIC | Age: 79
End: 2024-03-18
Payer: MEDICARE

## 2024-03-18 VITALS
SYSTOLIC BLOOD PRESSURE: 166 MMHG | OXYGEN SATURATION: 99 % | DIASTOLIC BLOOD PRESSURE: 62 MMHG | HEIGHT: 65 IN | HEART RATE: 89 BPM | BODY MASS INDEX: 28.57 KG/M2 | WEIGHT: 171.5 LBS

## 2024-03-18 DIAGNOSIS — J44.89 COPD WITH ASTHMA (MULTI): ICD-10-CM

## 2024-03-18 DIAGNOSIS — Z12.31 BREAST CANCER SCREENING BY MAMMOGRAM: Primary | ICD-10-CM

## 2024-03-18 DIAGNOSIS — F51.01 PRIMARY INSOMNIA: ICD-10-CM

## 2024-03-18 DIAGNOSIS — F41.1 GENERALIZED ANXIETY DISORDER: ICD-10-CM

## 2024-03-18 DIAGNOSIS — E05.90 HYPERTHYROIDISM: ICD-10-CM

## 2024-03-18 DIAGNOSIS — M10.9 GOUT, UNSPECIFIED CAUSE, UNSPECIFIED CHRONICITY, UNSPECIFIED SITE: ICD-10-CM

## 2024-03-18 DIAGNOSIS — D47.2 GAMMOPATHY: ICD-10-CM

## 2024-03-18 DIAGNOSIS — F32.5 MAJOR DEPRESSIVE DISORDER IN FULL REMISSION, UNSPECIFIED WHETHER RECURRENT (CMS-HCC): ICD-10-CM

## 2024-03-18 DIAGNOSIS — I10 PRIMARY HYPERTENSION: ICD-10-CM

## 2024-03-18 DIAGNOSIS — K21.9 GASTROESOPHAGEAL REFLUX DISEASE WITHOUT ESOPHAGITIS: ICD-10-CM

## 2024-03-18 PROCEDURE — 3078F DIAST BP <80 MM HG: CPT | Performed by: FAMILY MEDICINE

## 2024-03-18 PROCEDURE — 3077F SYST BP >= 140 MM HG: CPT | Performed by: FAMILY MEDICINE

## 2024-03-18 PROCEDURE — 1160F RVW MEDS BY RX/DR IN RCRD: CPT | Performed by: FAMILY MEDICINE

## 2024-03-18 PROCEDURE — 1036F TOBACCO NON-USER: CPT | Performed by: FAMILY MEDICINE

## 2024-03-18 PROCEDURE — 1159F MED LIST DOCD IN RCRD: CPT | Performed by: FAMILY MEDICINE

## 2024-03-18 PROCEDURE — 99214 OFFICE O/P EST MOD 30 MIN: CPT | Performed by: FAMILY MEDICINE

## 2024-03-18 NOTE — PROGRESS NOTES
"Subjective   Patient ID: Hailee David is a 78 y.o. female who presents for Follow-up (6 mo rev labs).    HPI   States today that her left breast is larger than her right, not sure if that's been the case, some pain, no mass  Some burning dyspepsia froepigastric into chest.  No relief with ranitidine.  Had 1 episode last month and 1 this month, lasting hours.  Had episode while in hospital several years ago and had emesis in hosp, last 2 episoldes not assoc woht emesisi  Inc to 40 pantoprazole  Review of records shows that it.  She had a gammopathy and was followed by Dr. Khalil and Dr. Jackman but has been lost to follow-up we will repeat SPEP at this time and likely refer to Dr. Hooper for Ms. Peraza  Arthritis getting  worse. Prev vallenki  Gout- no interval exacerbations, continues on prophylactic therapy without side effects.  COPD no exacerbations  Hyperthyroidism monitor TSH  Depression and insomnia managed with trazodone previously 200 she takes only 100 mg  HTN-Takes and tolerates meds without side effects. No alcohol. no tobacco. no exercise. low salt.  Reviewed recommendation for 150 minutes of exercise per week including 2 days of weight training if over age 50  CAD without angina  Review of Systems  As per HPI  Objective   /62   Pulse 89   Ht 1.651 m (5' 5\")   Wt 77.8 kg (171 lb 8 oz)   SpO2 99%   BMI 28.54 kg/m²     Physical Exam  Appears slightly pale.  No bruit thyroid is nontender heart regular.  Lungs clear.  Abdomen soft no solid or pulsatile masses.    Assessment/Plan   Problem List Items Addressed This Visit             ICD-10-CM    Acid reflux K21.9    Relevant Orders    Follow Up In Primary Care - Established    COPD with asthma J44.89    Relevant Orders    Follow Up In Primary Care - Established    Depression F32.A    Relevant Orders    Follow Up In Primary Care - Established    Generalized anxiety disorder F41.1    Relevant Orders    Follow Up In Primary Care - Established    Gout M10.9 "    Relevant Orders    Follow Up In Primary Care - Established    HTN (hypertension) I10    Relevant Orders    Follow Up In Primary Care - Established    Hyperthyroidism E05.90    Relevant Orders    Follow Up In Primary Care - Established    Insomnia G47.00    Relevant Orders    Follow Up In Primary Care - Established     Other Visit Diagnoses         Codes    Breast cancer screening by mammogram    -  Primary Z12.31    Relevant Orders    BI mammo bilateral screening tomosynthesis    Follow Up In Primary Care - Established    Gammopathy     D47.2    Relevant Orders    Serum Protein Electrophoresis    Follow Up In Primary Care - Established

## 2024-03-19 LAB — PROT SERPL-MCNC: 7.1 G/DL (ref 6.4–8.2)

## 2024-03-20 ENCOUNTER — TELEPHONE (OUTPATIENT)
Dept: PRIMARY CARE | Facility: CLINIC | Age: 79
End: 2024-03-20
Payer: MEDICARE

## 2024-03-20 DIAGNOSIS — D47.2 GAMMOPATHY: ICD-10-CM

## 2024-03-20 DIAGNOSIS — R53.82 CHRONIC FATIGUE: ICD-10-CM

## 2024-03-20 LAB
ALBUMIN: 3 G/DL (ref 3.4–5)
ALPHA 1 GLOBULIN: 0.4 G/DL (ref 0.2–0.6)
ALPHA 2 GLOBULIN: 1 G/DL (ref 0.4–1.1)
BETA GLOBULIN: 0.9 G/DL (ref 0.5–1.2)
GAMMA GLOBULIN: 1.8 G/DL (ref 0.5–1.4)
PATH REVIEW-SERUM PROTEIN ELECTROPHORESIS: ABNORMAL
PROTEIN ELECTROPHORESIS COMMENT: ABNORMAL

## 2024-03-20 NOTE — TELEPHONE ENCOUNTER
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  I would like for her to be seen by them again to rule out any hematologic cause for her fatigue

## 2024-03-27 ENCOUNTER — HOSPITAL ENCOUNTER (OUTPATIENT)
Dept: RADIOLOGY | Facility: HOSPITAL | Age: 79
Discharge: HOME | End: 2024-03-27
Payer: MEDICARE

## 2024-03-27 VITALS — WEIGHT: 170 LBS | BODY MASS INDEX: 28.32 KG/M2 | HEIGHT: 65 IN

## 2024-03-27 DIAGNOSIS — Z12.31 BREAST CANCER SCREENING BY MAMMOGRAM: ICD-10-CM

## 2024-03-27 PROCEDURE — 77063 BREAST TOMOSYNTHESIS BI: CPT | Performed by: RADIOLOGY

## 2024-03-27 PROCEDURE — 77067 SCR MAMMO BI INCL CAD: CPT

## 2024-03-27 PROCEDURE — 77067 SCR MAMMO BI INCL CAD: CPT | Performed by: RADIOLOGY

## 2024-03-29 ENCOUNTER — APPOINTMENT (OUTPATIENT)
Dept: HEMATOLOGY/ONCOLOGY | Facility: CLINIC | Age: 79
End: 2024-03-29
Payer: MEDICARE

## 2024-04-12 ENCOUNTER — OFFICE VISIT (OUTPATIENT)
Dept: HEMATOLOGY/ONCOLOGY | Facility: CLINIC | Age: 79
End: 2024-04-12
Payer: MEDICARE

## 2024-04-12 VITALS
RESPIRATION RATE: 22 BRPM | HEART RATE: 84 BPM | SYSTOLIC BLOOD PRESSURE: 143 MMHG | HEIGHT: 65 IN | BODY MASS INDEX: 28.72 KG/M2 | DIASTOLIC BLOOD PRESSURE: 76 MMHG | TEMPERATURE: 97.7 F | OXYGEN SATURATION: 100 % | WEIGHT: 172.4 LBS

## 2024-04-12 DIAGNOSIS — E04.1 THYROID NODULE: ICD-10-CM

## 2024-04-12 DIAGNOSIS — D47.2 GAMMOPATHY: ICD-10-CM

## 2024-04-12 DIAGNOSIS — R53.82 CHRONIC FATIGUE: ICD-10-CM

## 2024-04-12 DIAGNOSIS — D50.8 OTHER IRON DEFICIENCY ANEMIA: Primary | ICD-10-CM

## 2024-04-12 DIAGNOSIS — D50.9 IRON DEFICIENCY ANEMIA, UNSPECIFIED IRON DEFICIENCY ANEMIA TYPE: ICD-10-CM

## 2024-04-12 LAB
BASOPHILS # BLD AUTO: 0.04 X10*3/UL (ref 0–0.1)
BASOPHILS NFR BLD AUTO: 0.6 %
EOSINOPHIL # BLD AUTO: 0.24 X10*3/UL (ref 0–0.4)
EOSINOPHIL NFR BLD AUTO: 3.8 %
ERYTHROCYTE [DISTWIDTH] IN BLOOD BY AUTOMATED COUNT: 14.2 % (ref 11.5–14.5)
HCT VFR BLD AUTO: 28.7 % (ref 36–46)
HGB BLD-MCNC: 9 G/DL (ref 12–16)
IMM GRANULOCYTES # BLD AUTO: 0.01 X10*3/UL (ref 0–0.5)
IMM GRANULOCYTES NFR BLD AUTO: 0.2 % (ref 0–0.9)
IRON SATN MFR SERPL: 10 % (ref 25–45)
IRON SERPL-MCNC: 28 UG/DL (ref 35–150)
LYMPHOCYTES # BLD AUTO: 0.94 X10*3/UL (ref 0.8–3)
LYMPHOCYTES NFR BLD AUTO: 14.8 %
MCH RBC QN AUTO: 27.6 PG (ref 26–34)
MCHC RBC AUTO-ENTMCNC: 31.4 G/DL (ref 32–36)
MCV RBC AUTO: 88 FL (ref 80–100)
MONOCYTES # BLD AUTO: 0.51 X10*3/UL (ref 0.05–0.8)
MONOCYTES NFR BLD AUTO: 8 %
NEUTROPHILS # BLD AUTO: 4.63 X10*3/UL (ref 1.6–5.5)
NEUTROPHILS NFR BLD AUTO: 72.6 %
NRBC BLD-RTO: 0 /100 WBCS (ref 0–0)
PLATELET # BLD AUTO: 252 X10*3/UL (ref 150–450)
RBC # BLD AUTO: 3.26 X10*6/UL (ref 4–5.2)
TIBC SERPL-MCNC: 270 UG/DL (ref 240–445)
UIBC SERPL-MCNC: 242 UG/DL (ref 110–370)
WBC # BLD AUTO: 6.4 X10*3/UL (ref 4.4–11.3)

## 2024-04-12 PROCEDURE — 85025 COMPLETE CBC W/AUTO DIFF WBC: CPT | Performed by: INTERNAL MEDICINE

## 2024-04-12 PROCEDURE — 1125F AMNT PAIN NOTED PAIN PRSNT: CPT | Performed by: INTERNAL MEDICINE

## 2024-04-12 PROCEDURE — 1160F RVW MEDS BY RX/DR IN RCRD: CPT | Performed by: INTERNAL MEDICINE

## 2024-04-12 PROCEDURE — 1159F MED LIST DOCD IN RCRD: CPT | Performed by: INTERNAL MEDICINE

## 2024-04-12 PROCEDURE — 99215 OFFICE O/P EST HI 40 MIN: CPT | Performed by: INTERNAL MEDICINE

## 2024-04-12 PROCEDURE — 83540 ASSAY OF IRON: CPT | Performed by: INTERNAL MEDICINE

## 2024-04-12 PROCEDURE — 36415 COLL VENOUS BLD VENIPUNCTURE: CPT

## 2024-04-12 PROCEDURE — 3077F SYST BP >= 140 MM HG: CPT | Performed by: INTERNAL MEDICINE

## 2024-04-12 PROCEDURE — 3078F DIAST BP <80 MM HG: CPT | Performed by: INTERNAL MEDICINE

## 2024-04-12 PROCEDURE — 99205 OFFICE O/P NEW HI 60 MIN: CPT | Performed by: INTERNAL MEDICINE

## 2024-04-12 ASSESSMENT — ENCOUNTER SYMPTOMS
NERVOUS/ANXIOUS: 1
RHINORRHEA: 0
BRUISES/BLEEDS EASILY: 1
ARTHRALGIAS: 1
LIGHT-HEADEDNESS: 0
DYSURIA: 0
FREQUENCY: 1
SHORTNESS OF BREATH: 1
ABDOMINAL PAIN: 0
NAUSEA: 0
VOMITING: 0
UNEXPECTED WEIGHT CHANGE: 0
COUGH: 0
SLEEP DISTURBANCE: 0
TROUBLE SWALLOWING: 0
FATIGUE: 1
DIARRHEA: 0
CONSTIPATION: 0
HEADACHES: 0
MYALGIAS: 1
WEAKNESS: 1
APPETITE CHANGE: 1

## 2024-04-12 ASSESSMENT — PATIENT HEALTH QUESTIONNAIRE - PHQ9
SUM OF ALL RESPONSES TO PHQ9 QUESTIONS 1 AND 2: 0
2. FEELING DOWN, DEPRESSED OR HOPELESS: NOT AT ALL
SUM OF ALL RESPONSES TO PHQ9 QUESTIONS 1 AND 2: 2
1. LITTLE INTEREST OR PLEASURE IN DOING THINGS: NOT AT ALL
1. LITTLE INTEREST OR PLEASURE IN DOING THINGS: SEVERAL DAYS
10. IF YOU CHECKED OFF ANY PROBLEMS, HOW DIFFICULT HAVE THESE PROBLEMS MADE IT FOR YOU TO DO YOUR WORK, TAKE CARE OF THINGS AT HOME, OR GET ALONG WITH OTHER PEOPLE: NOT DIFFICULT AT ALL
2. FEELING DOWN, DEPRESSED OR HOPELESS: SEVERAL DAYS

## 2024-04-12 ASSESSMENT — COLUMBIA-SUICIDE SEVERITY RATING SCALE - C-SSRS
2. HAVE YOU ACTUALLY HAD ANY THOUGHTS OF KILLING YOURSELF?: NO
1. IN THE PAST MONTH, HAVE YOU WISHED YOU WERE DEAD OR WISHED YOU COULD GO TO SLEEP AND NOT WAKE UP?: NO
2. HAVE YOU ACTUALLY HAD ANY THOUGHTS OF KILLING YOURSELF?: NO
6. HAVE YOU EVER DONE ANYTHING, STARTED TO DO ANYTHING, OR PREPARED TO DO ANYTHING TO END YOUR LIFE?: NO
6. HAVE YOU EVER DONE ANYTHING, STARTED TO DO ANYTHING, OR PREPARED TO DO ANYTHING TO END YOUR LIFE?: NO

## 2024-04-12 ASSESSMENT — PAIN SCALES - GENERAL: PAINLEVEL: 5

## 2024-04-12 NOTE — PATIENT INSTRUCTIONS
Reviewed labs and recent medical history.  Discussed her fatigue and low red blood cells.  Reviewed her oral iron intake. Will check her labs. Orders placed.  Discussed her sore gums and encouraged her to take her upper dentures out at night to help with healing.  Reviewed with her regarding some fullness in her Thyroid. Order placed for an Ultrasound of her thyroid.  Dr. Jerez will call patient with her lab results.  Return to see MD in 1 month for follow up.

## 2024-04-12 NOTE — PROGRESS NOTES
Patient ID:Hailee David is a 79 y.o. year old female patient with chronic fatigue and anemia      Devon Lewis MD  [address] [id number]  Primary Care Provider: Devon Lewis MD    Chief Complaint  Chief Complaint   Patient presents with    Gammopathy chronic fatigue   She is here to reestablish medical oncology hematology care.  Last time she was here was in 2021.        History of the Present Illness  9/11/2018.  Seen by Dr. Newell from gastroenterology for iron deficiency anemia.  Hemoglobin had dropped to 7.9.  EGD and colonoscopy showed large hiatal hernia with Ibrahim's esophagus and colonoscopy showed no polyps.  Multiple biopsies have been taken and showed no dysplasia.  She also had benign polyps of the fundus of the stomach.  She was anticoagulated at that time.  Her weight at these visits was 223 pounds.    10/15/2018.  Echocardiogram showed normal left ventricular ejection fraction of 55 to 60% with no significant valvular heart disease.  The study was done because of dyspnea.    6/21/2021.  CBC showed white count 8.0 with a hemoglobin of 8.9, hematocrit 28.4 with an MCV of 86 MCHC of 31.2 and a platelet count 357,000.  Her RDW was 16.2.    8/16/2019.  Seen by Dr. Aba Carias.  He noted the patient had DVT and gastrointestinal bleeding in June 2018.  This required transfusion of several units of blood.  IVC filter was placed at that time.  Ultrasound done on 8/5/2019 showed partially occlusive chronic deep vein thrombosis of the left common femoral veins down to the popliteal vein.  This is consistent with old clot/scar tissue.  He said that she should be kept on this indefinitely, because there was an unprovoked clot.  There was further testing for thrombophilia.    10/8/2019.  BUN was 30 creatinine was 2.31 uric acid was 8.1.  PTH was 208.5.  Hemoglobin was 9.9 hematocrit was 31.6.    11/11/2019 through 11/14/2019.  Hospitalized with shortness of breath with a working  diagnosis of acute exacerbation of COPD.  She was treated with systemic steroids medication nebulizer treatments and respiratory support.  Regular medications including Lasix were continued she did not receive antibiotics.  CBC showed a white count of 9.2 with hemoglobin 10 hematocrit 31.6 and platelet count of 262,000.  BUN had gone up to 40 with a creatinine of 3.18 and peaked at 60/2.53 on 11/14/2019    2/15/2020 through 2/20/2020.  Admitted to the hospital with dizziness, recurrent falls and bilateral knee pain.  At that time she had acute kidney injury and hyperkalemia on the basis of orthostatic hypotension and dehydration.  Nephrology was consulted.  BUN was 28 and creatinine was 4.48.  CBC showed a white count 11,000 with a hemoglobin 11.3 hematocrit 36.1 and a platelet count of 364,000.  By the time of discharge her BUN was 20 and creatinine was 2.11.  Hemoglobin was 9.3 hematocrit 29.7.    3/12/2020.  BUN is 22 creatinine is 2.24.  CBC showed white count 10.4 with a hemoglobin 9.7 hematocrit 30.4 and platelet count 338,000.    2/15/2021.  BUN was 29 creatinine was 2.9.  6/21/2021.  BUN was 27 creatinine was 2.36.  Hemoglobin was 8.9 with hematocrit of 28.4    8/2/2021.  Referred to Dr. Khalil for anemia by Dr. Lewis.  She had had a history of unprovoked bilateral left greater than right lower extremity deep vein thrombosis diagnosed in June 2018.  She had an IVC filter placed and she was treated with Eliquis without any recurrence of bleeding.  She was referred for anemia which he felt was chronic with a hemoglobin ranging from 8-9 with an MCV of 86.  White blood count and platelet counts have been normal.  Her creatinine was significantly elevated at 2.36.  He noted that previous laboratory data in August 2020 showed a ferritin of 37 with a saturation of 12%.  Her B12 level was low normal at 244.  She was treated with oral iron which she tolerated well.  Folate was normal, haptoglobin was elevated, total  "protein was normal, LDH was normal    Her other complaints included low energy.  She says that she would fall asleep in the middle of the day.  She had no signs of infection.  She does have arthritic pain.    His assessment was that she had the anemia of chronic disease but also had iron deficiency we plan to recheck a CBC and iron parameters and give IV iron before consideration of JAVIER treatment.  He discussed the risks and benefits of erythropoietin particularly the cardiovascular risks of stroke and myocardial infarction as well as venous thromboembolic risk.  He also plan to reevaluate her for monoclonal gammopathy.  In reference to her unprovoked bilateral deep vein thrombosis, the plan was to keep her on anticoagulation indefinitely.    9/30/2021 through 10/6/2021.  Admitted to the hospital with cellulitis.  She had a history of pemphigus and presented to the emergency room with fevers, chills, confusion and was found to be septic with lactic acidosis, acute on chronic renal failure, acute metabolic encephalopathy and her hypothyroidism.  She was described to have \"severe angry erosive rash in both groin areas.  By a whitish-cream.  The rash was erythematous and tender to touch.  She was treated with broad-spectrum antibiotics, IV fluids and her Lasix was held.  Stool for occult blood was negative.  Hemoglobins were in the sevens with hematocrit in the low 20s.  White count and platelet count are normal.    10/28/2021.  CBC showed white count 10.2 with hemoglobin 10.5 hematocrit 33.7 and a platelet count of 314,000 with 71 polys 17 lymphs 9 monos and 1 eosinophil.    7/3/2022 through 7/7/2022.  Readmitted to the hospital with acute kidney injury superimposed on chronic kidney disease and urinary tract infection.  She was stabilized and able to be discharged home.  During that hospital stay her hemoglobin dropped again to 7.9 with hematocrit of 24.6.    5/8/2023.  Follow-up with Dr. Lewis.  Patient said that " she was up most nights despite taking mirtazapine and trazodone.  Subsequently mirtazapine was discontinued and trazodone was increased to 200 mg p.o. nightly.  Anemia was more problematic and she was referred back to hematology, but did not make that appointment.  Weight at that time was 175 pounds.    7/3/2023.  CBC showed a white count of 7000 hemoglobin 9.2 hematocrit 29.1 with a platelet count of 284,000 with a normal differential.    3/13/2024.  CBC showed a white count of 6.7 with hemoglobin 8.9 hematocrit 28.6 and a platelet count of 280,000.  Serum chemistry showed a BUN of 38 and creatinine of 2.33 and albumin of 3.3 and normal liver function test.  Her EGFR was down to 21.  TSH was normal.  Uric acid was normal.  Parathyroid hormone level was elevated at 104.9.    3/14/2024.  Laboratory data showed an albumin of 3.0.  She had polyclonal gammopathy consistent with chronic inflammation.    3/18/2024.  Follow-up with Dr. Lewis.  She told her physician that her left breast was larger than the right and sometimes she had pain but there is no mass.  She has occasional dyspepsia.  He referred her to us because of the previous diagnosis of polyclonal gammopathy.  Her weight was down to 171 pounds.    3/27/2024.  Mammogram no evidence of malignancy.  She was said to be heterogeneously dense discrete masses or suspicious microcalcifications.    Interval Note  4/12/2024.  She is here today to reestablish medical oncology/hematology care.  She says that she feels tired all the time.  She has felt this way for a couple years.  She says that she feels better if she is more active.  She says that her daughter-in-law is in charge of her medications.  She has discomfort everywhere which she attributes to arthritis.  She has not resumed smoking.  She says that she has been abstinent since she had a myocardial infarction when she was 55 years old.  She has had no recent bleeding.  She said that she takes 100 mg of  trazodone nightly because the 200 mg pill made her feel weird.  She says that she does not get a lot of exercise.  She walks back and forth to the bathroom.  She lives alone in a two-story only stays on the lower level.  She says that she does not talk to people daily and she does not have a Islam family and she does not have any pets.  Her current weight is 172 pounds 6.4 ounces and her last weight was 171 pounds 8 ounces.  When she was weighed here in 2021 she weighed 182.    Comorbidities  History of gastrointestinal bleeding  Ibrahim's esophagus  CKD felt to be secondary to diabetic nephropathy with hypertensive nephrosclerosis, followed by Dr. Dove  COPD  Asthma  GERD  Hypertension  Depression  Pemphigus  Degenerative joint disease  Hyperlipidemia  Diabetes mellitus  Coronary artery disease status post myocardial infarction in the 1990s, with left heart cath 7/17/2014.  Anxiety disorder  Psoriasis  Lung nodule  She is status post hysterectomy and lobectomy for low-grade carcinoid tumor 1.0 cm with negative margins and 1 positive lymph node.    Hypothyroidism  History of DVT in left leg around 2018 treated with anticoagulation.  Also had IVC filter placed at that time.  Insomnia    Monitoring Parameters  Histories and physical examinations, CBCs and iron studies    Review of Systems - Oncology   Review of Systems   Constitutional:  Positive for appetite change and fatigue (extreme fatigue). Negative for unexpected weight change.   HENT:  Negative for dental problem, mouth sores, rhinorrhea and trouble swallowing.    Eyes:  Negative for visual disturbance.   Respiratory:  Positive for shortness of breath. Negative for cough.    Cardiovascular:  Positive for leg swelling.   Gastrointestinal:  Negative for abdominal pain, constipation, diarrhea, nausea and vomiting.   Endocrine: Negative for polyuria.   Genitourinary:  Positive for frequency (sangita diuretic). Negative for dysuria and urgency.   Musculoskeletal:   Positive for arthralgias and myalgias (pain under her nose on her gums).   Skin:  Positive for pallor. Negative for rash.   Neurological:  Positive for weakness. Negative for light-headedness and headaches.   Hematological:  Bruises/bleeds easily.   Psychiatric/Behavioral:  Negative for self-injury, sleep disturbance and suicidal ideas. The patient is nervous/anxious.         Past Medical History  Past Medical History:   Diagnosis Date    Encounter for screening for malignant neoplasm of vagina     Vaginal Pap smear    Old myocardial infarction     History of myocardial infarction    Personal history of other endocrine, nutritional and metabolic disease 06/22/2020    History of diabetes mellitus    Personal history of other medical treatment     H/O mammogram        Surgical History  Past Surgical History:   Procedure Laterality Date    CARDIAC CATHETERIZATION  06/25/2018    Cardiac Cath Procedure Outcome:    HYSTERECTOMY  06/25/2018    Hysterectomy    OTHER SURGICAL HISTORY  06/25/2018    Uterine Surgery    OTHER SURGICAL HISTORY  06/25/2018    IVC Filter Placement W/ Fluorosc Angiogr Guidance W/ IV Contrast    OTHER SURGICAL HISTORY  11/21/2019    Exploratory laparotomy    OTHER SURGICAL HISTORY  09/20/2019    Dilation and curettage    OTHER SURGICAL HISTORY  11/21/2019    Breast surgery    OTHER SURGICAL HISTORY  11/21/2019    Colonoscopy    OTHER SURGICAL HISTORY  11/21/2019    Lung lobectomy       Social History  Social History     Tobacco Use    Smoking status: Never    Smokeless tobacco: Never   Vaping Use    Vaping status: Never Used   Substance Use Topics    Alcohol use: Never    Drug use: Defer   She is a former smoker having consumed 2 packs of cigarettes a day for about 15 years.  She does not drink alcohol.  She lives alone.  She had previously worked in a factory.    Family History  family history includes Breast cancer (age of onset: 70) in her sister; CVA in her mother; Heart attack in her father;  "Heart disease in her mother; Hypertension in her brother and sister; Lung cancer in her brother.  Mother had congestive heart failure.  Younger sister had kidney tumor.  Brother had myocardial infarction.  There is no specific history of bleeding or clotting disorders.       Current Medications  Current Outpatient Medications   Medication Instructions    allopurinol (ZYLOPRIM) 100 mg, oral, Daily    aspirin 81 mg, oral, Daily    betamethasone (augmented) (DIPROLENE) 0.05 %, Topical, 2 times daily    calcitriol (ROCALTROL) 0.25 mcg, oral, Daily    cholecalciferol (Vitamin D-3) 5,000 Units tablet 1 tablet, oral, Daily    Eliquis 2.5 mg, oral, 2 times daily    ferrous sulfate 325 (65 Fe) MG tablet 1 tablet, oral, Daily    fluticasone (Flovent Diskus) 250 mcg/actuation diskus inhaler 1 puff, inhalation, 2 times daily RT    furosemide (LASIX) 40 mg, oral, Daily    methIMAzole (TAPAZOLE) 5 mg, oral, Daily    metoprolol succinate XL (TOPROL-XL) 50 mg, oral, Daily    pantoprazole (PROTONIX) 20 mg, oral, Daily    potassium chloride CR 10 mEq ER tablet 10 mEq, oral, Daily    rosuvastatin (CRESTOR) 20 mg, oral, Nightly    traZODone (DESYREL) 200 mg, oral, Nightly    Ventolin HFA 90 mcg/actuation inhaler 2 puffs, inhalation, Every 6 hours PRN           OARRS Review  I have personally reviewed the OARRS report for Hailee David. I have considered the risks of abuse, dependence, addiction and diversion.  She is on no opioids, stimulants or sedatives.    Vital Signs  /76   Pulse 84   Temp 36.5 °C (97.7 °F)   Resp 22   Ht (S) 1.645 m (5' 4.76\")   Wt 78.2 kg (172 lb 6.4 oz)   SpO2 100%   BMI 28.90 kg/m²      Physical Exam  Vitals and nursing note reviewed. Exam conducted with a chaperone present.   Constitutional:       General: She is not in acute distress.     Appearance: She is ill-appearing (somewhat chronically).      Comments: She is a well-developed well-nourished, somewhat pale elderly  female who is " unaccompanied.  She has a walker.   HENT:      Head: Normocephalic and atraumatic.      Nose: Nose normal.      Mouth/Throat:      Mouth: Mucous membranes are moist.      Pharynx: Oropharynx is clear. No oropharyngeal exudate or posterior oropharyngeal erythema.   Eyes:      General: No scleral icterus.     Extraocular Movements: Extraocular movements intact.      Conjunctiva/sclera: Conjunctivae normal.      Pupils: Pupils are equal, round, and reactive to light.   Neck:      Comments: Thyroid is asymmetrically enlarged on the right and almost nonpalpable on the left.  There is no significant lymphadenopathy in the head neck region, supraclavicular or axillary areas bilaterally.  Cardiovascular:      Rate and Rhythm: Normal rate and regular rhythm.      Heart sounds: No murmur heard.  Pulmonary:      Effort: Pulmonary effort is normal. No respiratory distress.      Breath sounds: Wheezing (cleared with cough) present. No rhonchi or rales.   Chest:      Comments: States her left breast is bigger than her right breast.  Well healed verticle surgical scar. Under her left breast she has 3 reddenned areas from her pimphicus.  Abdominal:      General: There is no distension.      Palpations: Abdomen is soft. There is no mass.      Tenderness: There is no abdominal tenderness. There is no guarding or rebound.   Musculoskeletal:         General: Normal range of motion.      Cervical back: Normal range of motion. No tenderness.      Right lower leg: No edema.      Left lower leg: No edema.      Comments: [place] neck deformity on her index finger. Heberdens nodules on her DIP joints   Lymphadenopathy:      Cervical: No cervical adenopathy.   Skin:     General: Skin is warm and dry.      Coloration: Skin is pale. Skin is not jaundiced.      Findings: Rash present.   Neurological:      General: No focal deficit present.      Mental Status: She is alert and oriented to person, place, and time. Mental status is at baseline.       Motor: No weakness.      Gait: Gait normal.      Comments: Somewhat hard of hearing   Psychiatric:         Mood and Affect: Mood normal.         Behavior: Behavior normal.         Thought Content: Thought content normal.         Judgment: Judgment normal.      Comments: Seems depressed          Current Laboratory Data  Laboratory data drawn on 4/12/2024 included a CBC which showed a white count of 6.4 hemoglobin 9 hematocrit 28.7 and a platelet count 252,000.  MCV was 88 MCH was 27 MCHC was 31 and RDW was 14.2.  White blood cell differential count was normal.  Iron was 28 with a TIBC of 270 giving her an iron saturation of 10% signifying iron deficiency anemia.      Recent Imaging  BI mammo bilateral screening tomosynthesis    Result Date: 3/28/2024  Interpreted By:  Simba Jovel, STUDY: BI MAMMO BILATERAL SCREENING TOMOSYNTHESIS;  3/27/2024 3:06 pm   ACCESSION NUMBER(S): [id number]   ORDERING CLINICIAN: KAIT ADLER   INDICATION: Screening.   COMPARISON: Prior studies are not available for comparison.   FINDINGS: CC and MLO 2D digital mammograms and digital breast tomosynthesis images were obtained of the bilateral breasts. 3-D volume images were reconstructed in 4 views at an independent workstation as 1 mm slices through the breasts in both the CC and MLO projections.   Density:  The breast tissue is heterogeneously dense, which may obscure small masses.   Scattered vascular and dystrophic calcifications are seen bilaterally. No discrete mass or focal asymmetry is identified. No suspicious microcalcifications or foci of architectural distortion are seen.   This study was interpreted with CAD.       No mammographic evidence of malignancy.   BI-RADS CATEGORY:   BI-RADS Category:  2 Benign. Recommendation:  Routine Screening Mammogram in 1 Year. Recommended Date:  1 Year. Laterality:  Bilateral.     MACRO: None   Signed by: Simba Jovel 3/28/2024 8:55 AM Dictation workstation:   [id number]         Pathology  Iron deficiency anemia among other problems contributing to her chronic fatigue.  She also has significant renal dysfunction and multiple comorbidities.      Performance Status:  Symptomatic; fully ambulatory with her walker    Assessment/Plan    1.  Chronic fatigue.  She has many reasons for this.  She does have iron deficiency anemia and she also has chronic renal failure.  She is depressed and has been living alone and is fairly isolated in her contacts.    2.  Iron deficiency.  She has been on oral iron.  This has not been helpful.  We will give her iron infusions.  We will monitor her counts.    3.  Chronic renal failure.  She may need erythropoietin injections.  We will address this after we replenish her iron stores.  We will discuss the risks of this therapy including stroke, heart attack, deep vein thrombosis and hypertension.    4.  History of hyerthyroidism.  She is on Tapazole.  She has a very prominent right side of her thyroid.  I did order a thyroid ultrasound.    5.  Multiple comorbidities.  These include but are not limited to:  History of gastrointestinal bleeding  Ibrahim's esophagus  CKD  COPD  Asthma  GERD  Hypertension  Depression  Pemphigus  Degenerative joint disease  Hyperlipidemia  Diabetes mellitus  Coronary artery disease status post myocardial infarction in the 1990s  Anxiety disorder  Psoriasis  Lung nodule  She is status post hysterectomy and lobectomy for low-grade carcinoid tumor 1.0 cm with negative margins and 1 positive lymph node.    Hypothyroidism  History of DVT in left leg around 2018 treated with anticoagulation.  Also had IVC filter placed at that time.  Insomnia    6.  Depression.  She is being treated for this with trazodone and I think that this diagnosis is a significant part of her chronic fatigue.    7.  History of deep vein thrombosis.  She continues on anticoagulation.  She has an IVC filter in place.    8.  Polyclonal gammopathy.  This is reactive  rather than a primary problem and can be monitored.    9.  Pemphigus.  She has had this at least since 2017 at which time she had a biopsy.  This flares from time to time she has been hospitalized for complications of this process.        We have asked her to receive iron infusions.  We will see her a month after these infusions have been completed to check for response.  Will consider erythropoietin injections.  She knows to call if she has any change in her status, questions or concerns.    Abbey Jerez MD

## 2024-04-15 RX ORDER — DIPHENHYDRAMINE HYDROCHLORIDE 50 MG/ML
50 INJECTION INTRAMUSCULAR; INTRAVENOUS AS NEEDED
Status: CANCELLED | OUTPATIENT
Start: 2024-04-15

## 2024-04-15 RX ORDER — EPINEPHRINE 0.3 MG/.3ML
0.3 INJECTION SUBCUTANEOUS EVERY 5 MIN PRN
Status: CANCELLED | OUTPATIENT
Start: 2024-04-15

## 2024-04-15 RX ORDER — FAMOTIDINE 10 MG/ML
20 INJECTION INTRAVENOUS ONCE AS NEEDED
Status: CANCELLED | OUTPATIENT
Start: 2024-04-15

## 2024-04-15 RX ORDER — ALBUTEROL SULFATE 0.83 MG/ML
3 SOLUTION RESPIRATORY (INHALATION) AS NEEDED
Status: CANCELLED | OUTPATIENT
Start: 2024-04-15

## 2024-04-16 ENCOUNTER — HOSPITAL ENCOUNTER (OUTPATIENT)
Dept: RADIOLOGY | Facility: HOSPITAL | Age: 79
Discharge: HOME | End: 2024-04-16
Payer: MEDICARE

## 2024-04-16 DIAGNOSIS — E04.1 THYROID NODULE: ICD-10-CM

## 2024-04-16 PROCEDURE — 76536 US EXAM OF HEAD AND NECK: CPT

## 2024-04-16 PROCEDURE — 76536 US EXAM OF HEAD AND NECK: CPT | Performed by: RADIOLOGY

## 2024-04-18 ENCOUNTER — OFFICE VISIT (OUTPATIENT)
Dept: PRIMARY CARE | Facility: CLINIC | Age: 79
End: 2024-04-18
Payer: MEDICARE

## 2024-04-18 VITALS
DIASTOLIC BLOOD PRESSURE: 78 MMHG | HEART RATE: 86 BPM | BODY MASS INDEX: 28.59 KG/M2 | WEIGHT: 171.6 LBS | HEIGHT: 65 IN | OXYGEN SATURATION: 97 % | SYSTOLIC BLOOD PRESSURE: 162 MMHG

## 2024-04-18 DIAGNOSIS — F41.1 GENERALIZED ANXIETY DISORDER: ICD-10-CM

## 2024-04-18 DIAGNOSIS — E04.2 MULTIPLE THYROID NODULES: ICD-10-CM

## 2024-04-18 DIAGNOSIS — J44.89 COPD WITH ASTHMA (MULTI): ICD-10-CM

## 2024-04-18 DIAGNOSIS — I82.5Y2 CHRONIC DEEP VEIN THROMBOSIS (DVT) OF PROXIMAL VEIN OF LEFT LOWER EXTREMITY (MULTI): ICD-10-CM

## 2024-04-18 DIAGNOSIS — L03.314 CELLULITIS OF GROIN: ICD-10-CM

## 2024-04-18 DIAGNOSIS — D47.2 GAMMOPATHY: ICD-10-CM

## 2024-04-18 DIAGNOSIS — N18.4 STAGE 4 CHRONIC KIDNEY DISEASE (MULTI): ICD-10-CM

## 2024-04-18 DIAGNOSIS — F32.5 MAJOR DEPRESSIVE DISORDER IN FULL REMISSION, UNSPECIFIED WHETHER RECURRENT (CMS-HCC): ICD-10-CM

## 2024-04-18 DIAGNOSIS — I10 PRIMARY HYPERTENSION: ICD-10-CM

## 2024-04-18 DIAGNOSIS — M10.9 GOUT, UNSPECIFIED CAUSE, UNSPECIFIED CHRONICITY, UNSPECIFIED SITE: ICD-10-CM

## 2024-04-18 DIAGNOSIS — K21.9 GASTROESOPHAGEAL REFLUX DISEASE WITHOUT ESOPHAGITIS: ICD-10-CM

## 2024-04-18 DIAGNOSIS — I26.99 PULMONARY EMBOLISM, UNSPECIFIED CHRONICITY, UNSPECIFIED PULMONARY EMBOLISM TYPE, UNSPECIFIED WHETHER ACUTE COR PULMONALE PRESENT (MULTI): ICD-10-CM

## 2024-04-18 DIAGNOSIS — E05.90 HYPERTHYROIDISM: ICD-10-CM

## 2024-04-18 DIAGNOSIS — D64.9 ANEMIA, UNSPECIFIED TYPE: Primary | ICD-10-CM

## 2024-04-18 DIAGNOSIS — F51.01 PRIMARY INSOMNIA: ICD-10-CM

## 2024-04-18 DIAGNOSIS — I10 BENIGN ESSENTIAL HTN: ICD-10-CM

## 2024-04-18 DIAGNOSIS — Z12.31 BREAST CANCER SCREENING BY MAMMOGRAM: ICD-10-CM

## 2024-04-18 PROCEDURE — 1159F MED LIST DOCD IN RCRD: CPT | Performed by: FAMILY MEDICINE

## 2024-04-18 PROCEDURE — 3077F SYST BP >= 140 MM HG: CPT | Performed by: FAMILY MEDICINE

## 2024-04-18 PROCEDURE — 99214 OFFICE O/P EST MOD 30 MIN: CPT | Performed by: FAMILY MEDICINE

## 2024-04-18 PROCEDURE — 1160F RVW MEDS BY RX/DR IN RCRD: CPT | Performed by: FAMILY MEDICINE

## 2024-04-18 PROCEDURE — 3078F DIAST BP <80 MM HG: CPT | Performed by: FAMILY MEDICINE

## 2024-04-18 PROCEDURE — 1036F TOBACCO NON-USER: CPT | Performed by: FAMILY MEDICINE

## 2024-04-18 RX ORDER — DOXYCYCLINE 100 MG/1
100 CAPSULE ORAL 2 TIMES DAILY
Qty: 14 CAPSULE | Refills: 1 | Status: SHIPPED | OUTPATIENT
Start: 2024-04-18 | End: 2024-04-25

## 2024-04-18 NOTE — PROGRESS NOTES
"Subjective   Patient ID: Hailee David is a 79 y.o. female who presents for Follow-up (1 mo).    HPI Dr. Alethea Crum comprehensive note reviewed.  Spep neg for monoclonal.  Is approaching with iron infusions and if not successful in resolving anemia will consider erythropoietin.  Dr. Alethea Mercado identified multiple nodules in thyroid ultrasound shows a couple of mildly to moderately suspicious but she has a solid greater than 2 cm lesion for which I will refer to Dr. Meyers.  Who confirms that this is appropriate for his evaluation  Known pemphigus and rlq pain with mild cellulitis no other palpable abnormality.  She has 1/2 cm ribbon of epidermal disruption in the inguinal folds bilaterally.  Course of doxycycline to see if we can improve  Stage IV CKD creatinine 2.3 GFR 21  PE DVT continue lifelong Eliquis  History of hyperthyroidism continues long-term Tapazole with euthyroid chemistries  Dr. Alethea Mercadoes letter reviews that she feels there is an affective disorder and currently on Deseril.  For some reason chart shows 17 diagnoses brought down for a visit.  Review of Systems  Denies chest pains palpitations cough shortness of breath heartburn or abdominal pain.  Does have fatigue  Objective   /78   Pulse 86   Ht 1.645 m (5' 4.76\")   Wt 77.8 kg (171 lb 9.6 oz)   SpO2 97%   BMI 28.77 kg/m²     Physical Exam  Heart regular.  Lungs clear.  Abdomen soft nontender.  Skin of groin as described above.  Palpation of thyroid shows fullness on right side  Assessment/Plan   Problem List Items Addressed This Visit             ICD-10-CM    Acid reflux K21.9    Anemia - Primary D64.9    Relevant Orders    Follow Up In Primary Care    Benign essential HTN I10    COPD with asthma (Multi) J44.89    Depression F32.A    DVT, lower extremity (Multi) I82.409    Generalized anxiety disorder F41.1    Gout M10.9    HTN (hypertension) I10    Relevant Orders    Follow Up In Primary Care    Hyperthyroidism E05.90    Insomnia G47.00    " Pulmonary embolism (Multi) I26.99    Stage 4 chronic kidney disease (Multi) N18.4     Other Visit Diagnoses         Codes    Multiple thyroid nodules     E04.2    Relevant Orders    Referral to ENT    Follow Up In Primary Care    Breast cancer screening by mammogram     Z12.31    Gammopathy     D47.2    Cellulitis of groin     L03.314    Relevant Medications    doxycycline (Vibramycin) 100 mg capsule          Follow-up 3 months on all of above to see what status of active problems

## 2024-04-22 ENCOUNTER — TELEPHONE (OUTPATIENT)
Dept: HEMATOLOGY/ONCOLOGY | Facility: CLINIC | Age: 79
End: 2024-04-22
Payer: MEDICARE

## 2024-04-29 RX ORDER — HEPARIN SODIUM,PORCINE/PF 10 UNIT/ML
50 SYRINGE (ML) INTRAVENOUS AS NEEDED
Status: CANCELLED | OUTPATIENT
Start: 2024-04-29

## 2024-04-29 RX ORDER — HEPARIN 100 UNIT/ML
500 SYRINGE INTRAVENOUS AS NEEDED
Status: CANCELLED | OUTPATIENT
Start: 2024-04-29

## 2024-04-30 ENCOUNTER — INFUSION (OUTPATIENT)
Dept: HEMATOLOGY/ONCOLOGY | Facility: CLINIC | Age: 79
End: 2024-04-30
Payer: MEDICARE

## 2024-04-30 VITALS
DIASTOLIC BLOOD PRESSURE: 69 MMHG | SYSTOLIC BLOOD PRESSURE: 119 MMHG | HEART RATE: 73 BPM | RESPIRATION RATE: 18 BRPM | OXYGEN SATURATION: 100 % | TEMPERATURE: 97.3 F

## 2024-04-30 DIAGNOSIS — D50.9 IRON DEFICIENCY ANEMIA, UNSPECIFIED IRON DEFICIENCY ANEMIA TYPE: ICD-10-CM

## 2024-04-30 DIAGNOSIS — D64.9 ANEMIA, UNSPECIFIED TYPE: ICD-10-CM

## 2024-04-30 PROCEDURE — 2500000004 HC RX 250 GENERAL PHARMACY W/ HCPCS (ALT 636 FOR OP/ED): Performed by: INTERNAL MEDICINE

## 2024-04-30 PROCEDURE — 96374 THER/PROPH/DIAG INJ IV PUSH: CPT

## 2024-04-30 RX ORDER — EPINEPHRINE 0.3 MG/.3ML
0.3 INJECTION SUBCUTANEOUS EVERY 5 MIN PRN
Status: CANCELLED | OUTPATIENT
Start: 2024-05-03

## 2024-04-30 RX ORDER — HEPARIN 100 UNIT/ML
500 SYRINGE INTRAVENOUS AS NEEDED
Status: CANCELLED | OUTPATIENT
Start: 2024-04-30

## 2024-04-30 RX ORDER — DIPHENHYDRAMINE HYDROCHLORIDE 50 MG/ML
50 INJECTION INTRAMUSCULAR; INTRAVENOUS AS NEEDED
Status: CANCELLED | OUTPATIENT
Start: 2024-05-03

## 2024-04-30 RX ORDER — ALBUTEROL SULFATE 0.83 MG/ML
3 SOLUTION RESPIRATORY (INHALATION) AS NEEDED
Status: CANCELLED | OUTPATIENT
Start: 2024-05-03

## 2024-04-30 RX ORDER — HEPARIN SODIUM,PORCINE/PF 10 UNIT/ML
50 SYRINGE (ML) INTRAVENOUS AS NEEDED
Status: CANCELLED | OUTPATIENT
Start: 2024-04-30

## 2024-04-30 RX ORDER — FAMOTIDINE 10 MG/ML
20 INJECTION INTRAVENOUS ONCE AS NEEDED
Status: CANCELLED | OUTPATIENT
Start: 2024-05-03

## 2024-04-30 RX ADMIN — IRON SUCROSE 200 MG: 20 INJECTION, SOLUTION INTRAVENOUS at 13:27

## 2024-04-30 ASSESSMENT — PAIN SCALES - GENERAL: PAINLEVEL: 0-NO PAIN

## 2024-05-02 ENCOUNTER — INFUSION (OUTPATIENT)
Dept: HEMATOLOGY/ONCOLOGY | Facility: CLINIC | Age: 79
End: 2024-05-02
Payer: MEDICARE

## 2024-05-02 VITALS
HEART RATE: 73 BPM | SYSTOLIC BLOOD PRESSURE: 144 MMHG | OXYGEN SATURATION: 97 % | DIASTOLIC BLOOD PRESSURE: 73 MMHG | TEMPERATURE: 98.4 F | RESPIRATION RATE: 18 BRPM

## 2024-05-02 DIAGNOSIS — D64.9 ANEMIA, UNSPECIFIED TYPE: ICD-10-CM

## 2024-05-02 DIAGNOSIS — D50.9 IRON DEFICIENCY ANEMIA, UNSPECIFIED IRON DEFICIENCY ANEMIA TYPE: ICD-10-CM

## 2024-05-02 PROCEDURE — 2500000004 HC RX 250 GENERAL PHARMACY W/ HCPCS (ALT 636 FOR OP/ED): Performed by: INTERNAL MEDICINE

## 2024-05-02 PROCEDURE — 96374 THER/PROPH/DIAG INJ IV PUSH: CPT

## 2024-05-02 RX ORDER — EPINEPHRINE 0.3 MG/.3ML
0.3 INJECTION SUBCUTANEOUS EVERY 5 MIN PRN
Status: CANCELLED | OUTPATIENT
Start: 2024-05-03

## 2024-05-02 RX ORDER — DIPHENHYDRAMINE HYDROCHLORIDE 50 MG/ML
50 INJECTION INTRAMUSCULAR; INTRAVENOUS AS NEEDED
Status: CANCELLED | OUTPATIENT
Start: 2024-05-03

## 2024-05-02 RX ORDER — ALBUTEROL SULFATE 0.83 MG/ML
3 SOLUTION RESPIRATORY (INHALATION) AS NEEDED
Status: CANCELLED | OUTPATIENT
Start: 2024-05-03

## 2024-05-02 RX ORDER — HEPARIN 100 UNIT/ML
500 SYRINGE INTRAVENOUS AS NEEDED
Status: CANCELLED | OUTPATIENT
Start: 2024-05-02

## 2024-05-02 RX ORDER — HEPARIN SODIUM,PORCINE/PF 10 UNIT/ML
50 SYRINGE (ML) INTRAVENOUS AS NEEDED
Status: CANCELLED | OUTPATIENT
Start: 2024-05-02

## 2024-05-02 RX ORDER — FAMOTIDINE 10 MG/ML
20 INJECTION INTRAVENOUS ONCE AS NEEDED
Status: CANCELLED | OUTPATIENT
Start: 2024-05-03

## 2024-05-02 RX ADMIN — IRON SUCROSE 200 MG: 20 INJECTION, SOLUTION INTRAVENOUS at 14:18

## 2024-05-02 ASSESSMENT — PAIN SCALES - GENERAL: PAINLEVEL: 0-NO PAIN

## 2024-05-06 ENCOUNTER — INFUSION (OUTPATIENT)
Dept: HEMATOLOGY/ONCOLOGY | Facility: CLINIC | Age: 79
End: 2024-05-06
Payer: MEDICARE

## 2024-05-06 VITALS
SYSTOLIC BLOOD PRESSURE: 135 MMHG | HEART RATE: 69 BPM | OXYGEN SATURATION: 99 % | TEMPERATURE: 97.5 F | DIASTOLIC BLOOD PRESSURE: 73 MMHG | RESPIRATION RATE: 18 BRPM

## 2024-05-06 DIAGNOSIS — D50.9 IRON DEFICIENCY ANEMIA, UNSPECIFIED IRON DEFICIENCY ANEMIA TYPE: ICD-10-CM

## 2024-05-06 DIAGNOSIS — D64.9 ANEMIA, UNSPECIFIED TYPE: ICD-10-CM

## 2024-05-06 PROCEDURE — 96374 THER/PROPH/DIAG INJ IV PUSH: CPT

## 2024-05-06 PROCEDURE — 2500000004 HC RX 250 GENERAL PHARMACY W/ HCPCS (ALT 636 FOR OP/ED): Performed by: INTERNAL MEDICINE

## 2024-05-06 RX ORDER — EPINEPHRINE 0.3 MG/.3ML
0.3 INJECTION SUBCUTANEOUS EVERY 5 MIN PRN
Status: CANCELLED | OUTPATIENT
Start: 2024-05-08

## 2024-05-06 RX ORDER — FAMOTIDINE 10 MG/ML
20 INJECTION INTRAVENOUS ONCE AS NEEDED
Status: CANCELLED | OUTPATIENT
Start: 2024-05-08

## 2024-05-06 RX ORDER — HEPARIN SODIUM,PORCINE/PF 10 UNIT/ML
50 SYRINGE (ML) INTRAVENOUS AS NEEDED
Status: CANCELLED | OUTPATIENT
Start: 2024-05-06

## 2024-05-06 RX ORDER — DIPHENHYDRAMINE HYDROCHLORIDE 50 MG/ML
50 INJECTION INTRAMUSCULAR; INTRAVENOUS AS NEEDED
Status: CANCELLED | OUTPATIENT
Start: 2024-05-08

## 2024-05-06 RX ORDER — HEPARIN 100 UNIT/ML
500 SYRINGE INTRAVENOUS AS NEEDED
Status: CANCELLED | OUTPATIENT
Start: 2024-05-06

## 2024-05-06 RX ORDER — ALBUTEROL SULFATE 0.83 MG/ML
3 SOLUTION RESPIRATORY (INHALATION) AS NEEDED
Status: CANCELLED | OUTPATIENT
Start: 2024-05-08

## 2024-05-06 RX ADMIN — IRON SUCROSE 200 MG: 20 INJECTION, SOLUTION INTRAVENOUS at 13:13

## 2024-05-06 ASSESSMENT — PAIN SCALES - GENERAL: PAINLEVEL: 0-NO PAIN

## 2024-05-08 ENCOUNTER — APPOINTMENT (OUTPATIENT)
Dept: HEMATOLOGY/ONCOLOGY | Facility: CLINIC | Age: 79
End: 2024-05-08
Payer: MEDICARE

## 2024-05-10 ENCOUNTER — LAB (OUTPATIENT)
Dept: LAB | Facility: LAB | Age: 79
End: 2024-05-10
Payer: MEDICARE

## 2024-05-10 ENCOUNTER — APPOINTMENT (OUTPATIENT)
Dept: HEMATOLOGY/ONCOLOGY | Facility: CLINIC | Age: 79
End: 2024-05-10
Payer: MEDICARE

## 2024-05-10 ENCOUNTER — INFUSION (OUTPATIENT)
Dept: HEMATOLOGY/ONCOLOGY | Facility: CLINIC | Age: 79
End: 2024-05-10
Payer: MEDICARE

## 2024-05-10 VITALS
TEMPERATURE: 97.5 F | OXYGEN SATURATION: 100 % | HEART RATE: 70 BPM | RESPIRATION RATE: 16 BRPM | DIASTOLIC BLOOD PRESSURE: 79 MMHG | SYSTOLIC BLOOD PRESSURE: 162 MMHG

## 2024-05-10 DIAGNOSIS — D50.9 IRON DEFICIENCY ANEMIA, UNSPECIFIED IRON DEFICIENCY ANEMIA TYPE: ICD-10-CM

## 2024-05-10 DIAGNOSIS — N17.9 ACUTE KIDNEY FAILURE, UNSPECIFIED (CMS-HCC): Primary | ICD-10-CM

## 2024-05-10 DIAGNOSIS — D64.9 ANEMIA, UNSPECIFIED TYPE: ICD-10-CM

## 2024-05-10 LAB
ALBUMIN SERPL BCP-MCNC: 3.2 G/DL (ref 3.4–5)
ALP SERPL-CCNC: 87 U/L (ref 33–136)
ALT SERPL W P-5'-P-CCNC: 14 U/L (ref 7–45)
ANION GAP SERPL CALC-SCNC: 11 MMOL/L (ref 10–20)
AST SERPL W P-5'-P-CCNC: 18 U/L (ref 9–39)
BILIRUB SERPL-MCNC: 0.3 MG/DL (ref 0–1.2)
BUN SERPL-MCNC: 27 MG/DL (ref 6–23)
CALCIUM SERPL-MCNC: 8.3 MG/DL (ref 8.6–10.3)
CHLORIDE SERPL-SCNC: 109 MMOL/L (ref 98–107)
CO2 SERPL-SCNC: 25 MMOL/L (ref 21–32)
CREAT SERPL-MCNC: 1.95 MG/DL (ref 0.5–1.05)
EGFRCR SERPLBLD CKD-EPI 2021: 25 ML/MIN/1.73M*2
GLUCOSE SERPL-MCNC: 104 MG/DL (ref 74–99)
HCT VFR BLD AUTO: 30.1 % (ref 36–46)
HGB BLD-MCNC: 9.1 G/DL (ref 12–16)
POTASSIUM SERPL-SCNC: 4.1 MMOL/L (ref 3.5–5.3)
PROT SERPL-MCNC: 6.2 G/DL (ref 6.4–8.2)
SODIUM SERPL-SCNC: 141 MMOL/L (ref 136–145)
URATE SERPL-MCNC: 5.2 MG/DL (ref 2.3–6.7)

## 2024-05-10 PROCEDURE — 36415 COLL VENOUS BLD VENIPUNCTURE: CPT

## 2024-05-10 PROCEDURE — 85018 HEMOGLOBIN: CPT

## 2024-05-10 PROCEDURE — 2500000004 HC RX 250 GENERAL PHARMACY W/ HCPCS (ALT 636 FOR OP/ED)

## 2024-05-10 PROCEDURE — 83970 ASSAY OF PARATHORMONE: CPT

## 2024-05-10 PROCEDURE — 84550 ASSAY OF BLOOD/URIC ACID: CPT

## 2024-05-10 PROCEDURE — 85014 HEMATOCRIT: CPT

## 2024-05-10 PROCEDURE — 96374 THER/PROPH/DIAG INJ IV PUSH: CPT

## 2024-05-10 PROCEDURE — 80053 COMPREHEN METABOLIC PANEL: CPT

## 2024-05-10 RX ORDER — HEPARIN 100 UNIT/ML
500 SYRINGE INTRAVENOUS AS NEEDED
Status: CANCELLED | OUTPATIENT
Start: 2024-05-10

## 2024-05-10 RX ORDER — DIPHENHYDRAMINE HYDROCHLORIDE 50 MG/ML
50 INJECTION INTRAMUSCULAR; INTRAVENOUS AS NEEDED
Status: CANCELLED | OUTPATIENT
Start: 2024-05-12

## 2024-05-10 RX ORDER — ALBUTEROL SULFATE 0.83 MG/ML
3 SOLUTION RESPIRATORY (INHALATION) AS NEEDED
Status: CANCELLED | OUTPATIENT
Start: 2024-05-12

## 2024-05-10 RX ORDER — HEPARIN SODIUM,PORCINE/PF 10 UNIT/ML
50 SYRINGE (ML) INTRAVENOUS AS NEEDED
Status: CANCELLED | OUTPATIENT
Start: 2024-05-10

## 2024-05-10 RX ORDER — FAMOTIDINE 10 MG/ML
20 INJECTION INTRAVENOUS ONCE AS NEEDED
Status: CANCELLED | OUTPATIENT
Start: 2024-05-12

## 2024-05-10 RX ORDER — EPINEPHRINE 0.3 MG/.3ML
0.3 INJECTION SUBCUTANEOUS EVERY 5 MIN PRN
Status: CANCELLED | OUTPATIENT
Start: 2024-05-12

## 2024-05-10 RX ADMIN — IRON SUCROSE 200 MG: 20 INJECTION, SOLUTION INTRAVENOUS at 13:18

## 2024-05-10 ASSESSMENT — PAIN SCALES - GENERAL: PAINLEVEL: 0-NO PAIN

## 2024-05-11 LAB — PTH-INTACT SERPL-MCNC: 69.7 PG/ML (ref 18.5–88)

## 2024-05-14 ENCOUNTER — NUTRITION (OUTPATIENT)
Dept: HEMATOLOGY/ONCOLOGY | Facility: CLINIC | Age: 79
End: 2024-05-14

## 2024-05-14 ENCOUNTER — INFUSION (OUTPATIENT)
Dept: HEMATOLOGY/ONCOLOGY | Facility: CLINIC | Age: 79
End: 2024-05-14
Payer: MEDICARE

## 2024-05-14 VITALS
RESPIRATION RATE: 18 BRPM | TEMPERATURE: 97.7 F | DIASTOLIC BLOOD PRESSURE: 66 MMHG | OXYGEN SATURATION: 99 % | SYSTOLIC BLOOD PRESSURE: 124 MMHG | HEART RATE: 103 BPM

## 2024-05-14 DIAGNOSIS — D64.9 ANEMIA, UNSPECIFIED TYPE: ICD-10-CM

## 2024-05-14 DIAGNOSIS — D50.9 IRON DEFICIENCY ANEMIA, UNSPECIFIED IRON DEFICIENCY ANEMIA TYPE: Primary | ICD-10-CM

## 2024-05-14 PROCEDURE — 99215 OFFICE O/P EST HI 40 MIN: CPT

## 2024-05-14 PROCEDURE — 99211 OFF/OP EST MAY X REQ PHY/QHP: CPT

## 2024-05-14 RX ORDER — ALBUTEROL SULFATE 0.83 MG/ML
3 SOLUTION RESPIRATORY (INHALATION) AS NEEDED
Status: CANCELLED | OUTPATIENT
Start: 2024-05-16

## 2024-05-14 RX ORDER — FAMOTIDINE 10 MG/ML
20 INJECTION INTRAVENOUS ONCE AS NEEDED
Status: CANCELLED | OUTPATIENT
Start: 2024-05-16

## 2024-05-14 RX ORDER — HEPARIN 100 UNIT/ML
500 SYRINGE INTRAVENOUS AS NEEDED
Status: CANCELLED | OUTPATIENT
Start: 2024-05-14

## 2024-05-14 RX ORDER — HEPARIN SODIUM,PORCINE/PF 10 UNIT/ML
50 SYRINGE (ML) INTRAVENOUS AS NEEDED
Status: CANCELLED | OUTPATIENT
Start: 2024-05-14

## 2024-05-14 RX ORDER — DIPHENHYDRAMINE HYDROCHLORIDE 50 MG/ML
50 INJECTION INTRAMUSCULAR; INTRAVENOUS AS NEEDED
Status: CANCELLED | OUTPATIENT
Start: 2024-05-16

## 2024-05-14 RX ORDER — EPINEPHRINE 0.3 MG/.3ML
0.3 INJECTION SUBCUTANEOUS EVERY 5 MIN PRN
Status: CANCELLED | OUTPATIENT
Start: 2024-05-16

## 2024-05-14 ASSESSMENT — PAIN SCALES - GENERAL: PAINLEVEL: 0-NO PAIN

## 2024-05-14 NOTE — PROGRESS NOTES
"NUTRITION Assessment NOTE    Nutrition Assessment     Reason for Visit:  Hailee David is a 79 y.o. female who presents for Nutrition Counseling    5/14/24 patient seen per nursing referral for general nutrition.  Receives iron supplementation/infusion.  Diet recall indicates poor nutritional intake due to difficulty in cooking with back pain.  Uses [employer] TV meals.  Offered meals on wheels referral but declined for now.     Lab Results   Component Value Date/Time    GLUCOSE 104 (H) 05/10/2024 1352     05/10/2024 1352    K 4.1 05/10/2024 1352     (H) 05/10/2024 1352    CO2 25 05/10/2024 1352    ANIONGAP 11 05/10/2024 1352    BUN 27 (H) 05/10/2024 1352    CREATININE 1.95 (H) 05/10/2024 1352    EGFR 25 (L) 05/10/2024 1352    CALCIUM 8.3 (L) 05/10/2024 1352    ALBUMIN 3.2 (L) 05/10/2024 1352    ALKPHOS 87 05/10/2024 1352    PROT 6.2 (L) 05/10/2024 1352    AST 18 05/10/2024 1352    BILITOT 0.3 05/10/2024 1352    ALT 14 05/10/2024 1352     No results found for: \"VITD25\"    Anthropometrics:  Anthropometrics  IBW/kg (Dietitian Calculated): 56.8 kg  Percent of IBW: 137 %        Wt Readings from Last 10 Encounters:   04/18/24 77.8 kg (171 lb 9.6 oz)   04/12/24 78.2 kg (172 lb 6.4 oz)   03/27/24 77.1 kg (170 lb)   03/18/24 77.8 kg (171 lb 8 oz)   09/18/23 72.4 kg (159 lb 11.2 oz)   05/08/23 79.5 kg (175 lb 3.2 oz)   10/21/21 80.8 kg (178 lb 1 oz)   03/22/21 92.6 kg (204 lb 1 oz)   09/21/20 90.3 kg (199 lb)   03/17/20 95.7 kg (211 lb)        Food And Nutrient Intake:  Food and Nutrient History  Food and Nutrient History: oral nutrition  Energy Intake: Fair 50-75 %  Fluid Intake: 2-3 large cups water  Oral Problems: denies     Food Intake  Amount of Food: Difficult to prepare meals due to back pain. Has groceries delivered  Meal 1: No breakfast  Meal 2: TV dinner - Healthy Choice  Meal 3: Will prepare a meal - meat or sandwich.  Food Variety: Absent    Food Preparation  Cooking: Patient  Grocery Shopping: " Patient (delivered)       Energy Needs  Estimated Energy Needs  Total Energy Estimated Needs (kCal): 1950 kCal  Total Estimated Energy Need per Day (kCal/kg): 25 kCal/kg  Method for Estimating Needs: 25 kcal/kg = 1950  Estimated Fluid Needs  Total Fluid Estimated Needs (mL): 1950 mL  Method for Estimating Needs: 1 ml/kcal = 65 oz  Estimated Protein Needs  Total Protein Estimated Needs (g): 94 g  Total Protein Estimated Needs (g/kg): 1.2 g/kg  Method for Estimating Needs: 1.2 gm/kg        Nutrition Diagnosis   Malnutrition Diagnosis  Patient has Malnutrition Diagnosis: No    Nutrition Diagnosis  Patient has Nutrition Diagnosis: Yes  Diagnosis Status (1): New  Nutrition Diagnosis 1: Altered nutrition related to laboratory values  Related to (1): nutritional intake  As Evidenced by (1): Low iron  Additional Nutrition Diagnosis: Diagnosis 2  Diagnosis Status (2): New  Nutrition Diagnosis 2: Predicted inadequate energy intake  Related to (2): eating habits,ability to cook  As Evidenced by (2): diet recall    Nutrition Interventions/Recommendations   Nutrition Prescription  Individualized Nutrition Prescription Provided for : Oral nutritio    Food and Nutrition Delivery  Food and Nutrition Delivery  Meals & Snacks: General Healthful Diet  Mineral Supplement Therapy: Iron  Goals: Recieves iron infusion    Nutrition Education  Nutrition Education  Nutrition Education Content: Content related nutrition education  Goals: Iron rich diet, General nutrition - provided written information and reviewed.  Offered referral to Meals on Wheels but denied for now    Coordination of Care  Coordination of Nutrition Care by a Nutrition Professional  Collaboration and referral of nutrition care: Team meeting involving nutrition professional  Goals: Oncology team    There are no Patient Instructions on file for this visit.    Nutrition Monitoring and Evaluation   Food/Nutrient Related History Monitoring  Monitoring and Evaluation Plan:  Mineral/element intake, Amount of food, Fluid intake  Fluid Intake: Estimated fluid intake  Criteria: Cosnume adequate fluids - BUN/Cr  Amount of Food: Estimated amout of food  Criteria: >75% estimated needs  Mineral/Element Intake: Measured mineral/element intake  Criteria: High iron foods  Biochemical Data, Medical Tests and Procedures  Monitoring and Evaluation Plan: Nutritional anemia profile, Electrolyte/renal panel  Electrolyte and Renal Panel: BUN  Criteria: Improve  Nutritional Anemia Profile: Iron, serum  Criteria: Improvement      Readiness to Change : Fair  Level of Understanding : Fair  Anticipated Compliant : Poor  Queenie Senior MS RD  LD

## 2024-05-23 ENCOUNTER — INFUSION (OUTPATIENT)
Dept: HEMATOLOGY/ONCOLOGY | Facility: CLINIC | Age: 79
End: 2024-05-23
Payer: MEDICARE

## 2024-05-23 VITALS
RESPIRATION RATE: 18 BRPM | HEART RATE: 87 BPM | TEMPERATURE: 98.4 F | OXYGEN SATURATION: 99 % | SYSTOLIC BLOOD PRESSURE: 122 MMHG | DIASTOLIC BLOOD PRESSURE: 74 MMHG

## 2024-05-23 DIAGNOSIS — D64.9 ANEMIA, UNSPECIFIED TYPE: ICD-10-CM

## 2024-05-23 DIAGNOSIS — D50.9 IRON DEFICIENCY ANEMIA, UNSPECIFIED IRON DEFICIENCY ANEMIA TYPE: ICD-10-CM

## 2024-05-23 PROCEDURE — 2500000004 HC RX 250 GENERAL PHARMACY W/ HCPCS (ALT 636 FOR OP/ED)

## 2024-05-23 PROCEDURE — 96374 THER/PROPH/DIAG INJ IV PUSH: CPT

## 2024-05-23 RX ORDER — EPINEPHRINE 0.3 MG/.3ML
0.3 INJECTION SUBCUTANEOUS EVERY 5 MIN PRN
OUTPATIENT
Start: 2024-05-23

## 2024-05-23 RX ORDER — ALBUTEROL SULFATE 0.83 MG/ML
3 SOLUTION RESPIRATORY (INHALATION) AS NEEDED
OUTPATIENT
Start: 2024-05-23

## 2024-05-23 RX ORDER — HEPARIN 100 UNIT/ML
500 SYRINGE INTRAVENOUS AS NEEDED
OUTPATIENT
Start: 2024-05-23

## 2024-05-23 RX ORDER — HEPARIN SODIUM,PORCINE/PF 10 UNIT/ML
50 SYRINGE (ML) INTRAVENOUS AS NEEDED
OUTPATIENT
Start: 2024-05-23

## 2024-05-23 RX ORDER — FAMOTIDINE 10 MG/ML
20 INJECTION INTRAVENOUS ONCE AS NEEDED
OUTPATIENT
Start: 2024-05-23

## 2024-05-23 RX ORDER — DIPHENHYDRAMINE HYDROCHLORIDE 50 MG/ML
50 INJECTION INTRAMUSCULAR; INTRAVENOUS AS NEEDED
OUTPATIENT
Start: 2024-05-23

## 2024-05-23 RX ADMIN — IRON SUCROSE 200 MG: 20 INJECTION, SOLUTION INTRAVENOUS at 13:13

## 2024-05-23 ASSESSMENT — PAIN SCALES - GENERAL: PAINLEVEL: 0-NO PAIN

## 2024-06-07 ENCOUNTER — OFFICE VISIT (OUTPATIENT)
Dept: HEMATOLOGY/ONCOLOGY | Facility: CLINIC | Age: 79
End: 2024-06-07
Payer: MEDICARE

## 2024-06-07 VITALS
RESPIRATION RATE: 16 BRPM | WEIGHT: 170.8 LBS | TEMPERATURE: 97.3 F | DIASTOLIC BLOOD PRESSURE: 70 MMHG | SYSTOLIC BLOOD PRESSURE: 135 MMHG | HEART RATE: 78 BPM | OXYGEN SATURATION: 100 % | BODY MASS INDEX: 28.63 KG/M2

## 2024-06-07 DIAGNOSIS — D47.2 GAMMOPATHY: ICD-10-CM

## 2024-06-07 DIAGNOSIS — E04.1 THYROID NODULE: ICD-10-CM

## 2024-06-07 DIAGNOSIS — D50.8 OTHER IRON DEFICIENCY ANEMIA: ICD-10-CM

## 2024-06-07 DIAGNOSIS — R53.82 CHRONIC FATIGUE: ICD-10-CM

## 2024-06-07 LAB
ALBUMIN SERPL BCP-MCNC: 3.4 G/DL (ref 3.4–5)
ALP SERPL-CCNC: 90 U/L (ref 33–136)
ALT SERPL W P-5'-P-CCNC: 7 U/L (ref 7–45)
ANION GAP SERPL CALC-SCNC: 10 MMOL/L (ref 10–20)
AST SERPL W P-5'-P-CCNC: 13 U/L (ref 9–39)
BASOPHILS # BLD AUTO: 0.04 X10*3/UL (ref 0–0.1)
BASOPHILS NFR BLD AUTO: 0.5 %
BILIRUB SERPL-MCNC: 0.2 MG/DL (ref 0–1.2)
BUN SERPL-MCNC: 41 MG/DL (ref 6–23)
CALCIUM SERPL-MCNC: 8.4 MG/DL (ref 8.6–10.3)
CHLORIDE SERPL-SCNC: 105 MMOL/L (ref 98–107)
CO2 SERPL-SCNC: 28 MMOL/L (ref 21–32)
CREAT SERPL-MCNC: 2.48 MG/DL (ref 0.5–1.05)
EGFRCR SERPLBLD CKD-EPI 2021: 19 ML/MIN/1.73M*2
EOSINOPHIL # BLD AUTO: 0.24 X10*3/UL (ref 0–0.4)
EOSINOPHIL NFR BLD AUTO: 3.1 %
ERYTHROCYTE [DISTWIDTH] IN BLOOD BY AUTOMATED COUNT: 15.9 % (ref 11.5–14.5)
GLUCOSE SERPL-MCNC: 152 MG/DL (ref 74–99)
HCT VFR BLD AUTO: 32.2 % (ref 36–46)
HGB BLD-MCNC: 9.8 G/DL (ref 12–16)
IMM GRANULOCYTES # BLD AUTO: 0.02 X10*3/UL (ref 0–0.5)
IMM GRANULOCYTES NFR BLD AUTO: 0.3 % (ref 0–0.9)
IRON SATN MFR SERPL: 21 % (ref 25–45)
IRON SERPL-MCNC: 50 UG/DL (ref 35–150)
LYMPHOCYTES # BLD AUTO: 1.04 X10*3/UL (ref 0.8–3)
LYMPHOCYTES NFR BLD AUTO: 13.4 %
MCH RBC QN AUTO: 27.9 PG (ref 26–34)
MCHC RBC AUTO-ENTMCNC: 30.4 G/DL (ref 32–36)
MCV RBC AUTO: 92 FL (ref 80–100)
MONOCYTES # BLD AUTO: 0.52 X10*3/UL (ref 0.05–0.8)
MONOCYTES NFR BLD AUTO: 6.7 %
NEUTROPHILS # BLD AUTO: 5.89 X10*3/UL (ref 1.6–5.5)
NEUTROPHILS NFR BLD AUTO: 76 %
NRBC BLD-RTO: 0 /100 WBCS (ref 0–0)
PLATELET # BLD AUTO: 244 X10*3/UL (ref 150–450)
POTASSIUM SERPL-SCNC: 4.4 MMOL/L (ref 3.5–5.3)
PROT SERPL-MCNC: 6.8 G/DL (ref 6.4–8.2)
RBC # BLD AUTO: 3.51 X10*6/UL (ref 4–5.2)
SODIUM SERPL-SCNC: 139 MMOL/L (ref 136–145)
TIBC SERPL-MCNC: 241 UG/DL (ref 240–445)
UIBC SERPL-MCNC: 191 UG/DL (ref 110–370)
WBC # BLD AUTO: 7.8 X10*3/UL (ref 4.4–11.3)

## 2024-06-07 PROCEDURE — 99213 OFFICE O/P EST LOW 20 MIN: CPT | Performed by: INTERNAL MEDICINE

## 2024-06-07 PROCEDURE — 1159F MED LIST DOCD IN RCRD: CPT | Performed by: INTERNAL MEDICINE

## 2024-06-07 PROCEDURE — 3078F DIAST BP <80 MM HG: CPT | Performed by: INTERNAL MEDICINE

## 2024-06-07 PROCEDURE — 83550 IRON BINDING TEST: CPT | Performed by: INTERNAL MEDICINE

## 2024-06-07 PROCEDURE — 82668 ASSAY OF ERYTHROPOIETIN: CPT | Performed by: INTERNAL MEDICINE

## 2024-06-07 PROCEDURE — 85025 COMPLETE CBC W/AUTO DIFF WBC: CPT | Performed by: INTERNAL MEDICINE

## 2024-06-07 PROCEDURE — 1126F AMNT PAIN NOTED NONE PRSNT: CPT | Performed by: INTERNAL MEDICINE

## 2024-06-07 PROCEDURE — 36415 COLL VENOUS BLD VENIPUNCTURE: CPT

## 2024-06-07 PROCEDURE — 3075F SYST BP GE 130 - 139MM HG: CPT | Performed by: INTERNAL MEDICINE

## 2024-06-07 PROCEDURE — 80053 COMPREHEN METABOLIC PANEL: CPT | Performed by: INTERNAL MEDICINE

## 2024-06-07 RX ORDER — TRAZODONE HYDROCHLORIDE 100 MG/1
100 TABLET ORAL NIGHTLY
COMMUNITY

## 2024-06-07 RX ORDER — FUROSEMIDE 40 MG/1
40 TABLET ORAL DAILY
COMMUNITY

## 2024-06-07 ASSESSMENT — COLUMBIA-SUICIDE SEVERITY RATING SCALE - C-SSRS
1. IN THE PAST MONTH, HAVE YOU WISHED YOU WERE DEAD OR WISHED YOU COULD GO TO SLEEP AND NOT WAKE UP?: NO
2. HAVE YOU ACTUALLY HAD ANY THOUGHTS OF KILLING YOURSELF?: NO
6. HAVE YOU EVER DONE ANYTHING, STARTED TO DO ANYTHING, OR PREPARED TO DO ANYTHING TO END YOUR LIFE?: NO

## 2024-06-07 ASSESSMENT — PATIENT HEALTH QUESTIONNAIRE - PHQ9
2. FEELING DOWN, DEPRESSED OR HOPELESS: NOT AT ALL
10. IF YOU CHECKED OFF ANY PROBLEMS, HOW DIFFICULT HAVE THESE PROBLEMS MADE IT FOR YOU TO DO YOUR WORK, TAKE CARE OF THINGS AT HOME, OR GET ALONG WITH OTHER PEOPLE: NOT DIFFICULT AT ALL
SUM OF ALL RESPONSES TO PHQ9 QUESTIONS 1 AND 2: 0
1. LITTLE INTEREST OR PLEASURE IN DOING THINGS: NOT AT ALL

## 2024-06-07 ASSESSMENT — PAIN SCALES - GENERAL: PAINLEVEL: 0-NO PAIN

## 2024-06-07 NOTE — PATIENT INSTRUCTIONS
Reviewed labs and recent medical history.  Discussed her last labs from her nephrologist visit. Will plan to recheck her labs. Orders added.  Discussed the result of her Ultrasound of her thyroid. Will plan to recheck another ultrasound in 3 months. (From last US in April) Order placed.  Return to see MD in 1 month after US to review.

## 2024-06-07 NOTE — PROGRESS NOTES
LABS DRAWN TODAY  US THYROID 7/8 2PM AT [facility]  RTC 7/17 3PM MD VISIT  Reviewed AVS with patient- patient verbalizes understanding

## 2024-06-07 NOTE — PROGRESS NOTES
Patient ID:Hailee David is a 79 y.o. year old female patient with chronic fatigue and anemia         Referring Physician: Abbey Jerez MD  [address] [place] [address] [address]  [place] [id number]  Primary Care Provider: Devon Lewis MD    Chief Complaint  Chief Complaint   Patient presents with    Anemia          History of the Present Illness  9/11/2018.  Seen by Dr. Newell from gastroenterology for iron deficiency anemia.  Hemoglobin had dropped to 7.9.  EGD and colonoscopy showed large hiatal hernia with Ibrahim's esophagus and colonoscopy showed no polyps.  Multiple biopsies have been taken and showed no dysplasia.  She also had benign polyps of the fundus of the stomach.  She was anticoagulated at that time.  Her weight at these visits was 223 pounds.     10/15/2018.  Echocardiogram showed normal left ventricular ejection fraction of 55 to 60% with no significant valvular heart disease.  The study was done because of dyspnea.     6/21/2021.  CBC showed white count 8.0 with a hemoglobin of 8.9, hematocrit 28.4 with an MCV of 86 MCHC of 31.2 and a platelet count 357,000.  Her RDW was 16.2.     8/16/2019.  Seen by Dr. Aba Carias.  He noted the patient had DVT and gastrointestinal bleeding in June 2018.  This required transfusion of several units of blood.  IVC filter was placed at that time.  Ultrasound done on 8/5/2019 showed partially occlusive chronic deep vein thrombosis of the left common femoral veins down to the popliteal vein.  This is consistent with old clot/scar tissue.  He said that she should be kept on this indefinitely, because there was an unprovoked clot.  There was further testing for thrombophilia.     10/8/2019.  BUN was 30 creatinine was 2.31 uric acid was 8.1.  PTH was 208.5.  Hemoglobin was 9.9 hematocrit was 31.6.     11/11/2019 through 11/14/2019.  Hospitalized with shortness of breath with a working diagnosis of acute exacerbation of COPD.  She was treated with systemic steroids  medication nebulizer treatments and respiratory support.  Regular medications including Lasix were continued she did not receive antibiotics.  CBC showed a white count of 9.2 with hemoglobin 10 hematocrit 31.6 and platelet count of 262,000.  BUN had gone up to 40 with a creatinine of 3.18 and peaked at 60/2.53 on 11/14/2019     2/15/2020 through 2/20/2020.  Admitted to the hospital with dizziness, recurrent falls and bilateral knee pain.  At that time she had acute kidney injury and hyperkalemia on the basis of orthostatic hypotension and dehydration.  Nephrology was consulted.  BUN was 28 and creatinine was 4.48.  CBC showed a white count 11,000 with a hemoglobin 11.3 hematocrit 36.1 and a platelet count of 364,000.  By the time of discharge her BUN was 20 and creatinine was 2.11.  Hemoglobin was 9.3 hematocrit 29.7.     3/12/2020.  BUN is 22 creatinine is 2.24.  CBC showed white count 10.4 with a hemoglobin 9.7 hematocrit 30.4 and platelet count 338,000.     2/15/2021.  BUN was 29 creatinine was 2.9.  6/21/2021.  BUN was 27 creatinine was 2.36.  Hemoglobin was 8.9 with hematocrit of 28.4     8/2/2021.  Referred to Dr. Khalil for anemia by Dr. Lewis.  She had had a history of unprovoked bilateral left greater than right lower extremity deep vein thrombosis diagnosed in June 2018.  She had an IVC filter placed and she was treated with Eliquis without any recurrence of bleeding.  She was referred for anemia which he felt was chronic with a hemoglobin ranging from 8-9 with an MCV of 86.  White blood count and platelet counts have been normal.  Her creatinine was significantly elevated at 2.36.  He noted that previous laboratory data in August 2020 showed a ferritin of 37 with a saturation of 12%.  Her B12 level was low normal at 244.  She was treated with oral iron which she tolerated well.  Folate was normal, haptoglobin was elevated, total protein was normal, LDH was normal     Her other complaints included low  "energy.  She says that she would fall asleep in the middle of the day.  She had no signs of infection.  She does have arthritic pain.     His assessment was that she had the anemia of chronic disease but also had iron deficiency we plan to recheck a CBC and iron parameters and give IV iron before consideration of JAVIER treatment.  He discussed the risks and benefits of erythropoietin particularly the cardiovascular risks of stroke and myocardial infarction as well as venous thromboembolic risk.  He also plan to reevaluate her for monoclonal gammopathy.  In reference to her unprovoked bilateral deep vein thrombosis, the plan was to keep her on anticoagulation indefinitely.     9/30/2021 through 10/6/2021.  Admitted to the hospital with cellulitis.  She had a history of pemphigus and presented to the emergency room with fevers, chills, confusion and was found to be septic with lactic acidosis, acute on chronic renal failure, acute metabolic encephalopathy and her hypothyroidism.  She was described to have \"severe angry erosive rash in both groin areas.  By a whitish-cream.  The rash was erythematous and tender to touch.  She was treated with broad-spectrum antibiotics, IV fluids and her Lasix was held.  Stool for occult blood was negative.  Hemoglobins were in the sevens with hematocrit in the low 20s.  White count and platelet count are normal.     10/28/2021.  CBC showed white count 10.2 with hemoglobin 10.5 hematocrit 33.7 and a platelet count of 314,000 with 71 polys 17 lymphs 9 monos and 1 eosinophil.     7/3/2022 through 7/7/2022.  Readmitted to the hospital with acute kidney injury superimposed on chronic kidney disease and urinary tract infection.  She was stabilized and able to be discharged home.  During that hospital stay her hemoglobin dropped again to 7.9 with hematocrit of 24.6.     5/8/2023.  Follow-up with Dr. Lewis.  Patient said that she was up most nights despite taking mirtazapine and trazodone.  " Subsequently mirtazapine was discontinued and trazodone was increased to 200 mg p.o. nightly.  Anemia was more problematic and she was referred back to hematology, but did not make that appointment.  Weight at that time was 175 pounds.     7/3/2023.  CBC showed a white count of 7000 hemoglobin 9.2 hematocrit 29.1 with a platelet count of 284,000 with a normal differential.     3/13/2024.  CBC showed a white count of 6.7 with hemoglobin 8.9 hematocrit 28.6 and a platelet count of 280,000.  Serum chemistry showed a BUN of 38 and creatinine of 2.33 and albumin of 3.3 and normal liver function test.  Her EGFR was down to 21.  TSH was normal.  Uric acid was normal.  Parathyroid hormone level was elevated at 104.9.     3/14/2024.  Laboratory data showed an albumin of 3.0.  She had polyclonal gammopathy consistent with chronic inflammation.     3/18/2024.  Follow-up with Dr. Lewis.  She told her physician that her left breast was larger than the right and sometimes she had pain but there is no mass.  She has occasional dyspepsia.  He referred her to us because of the previous diagnosis of polyclonal gammopathy.  Her weight was down to 171 pounds.     3/27/2024.  Mammogram no evidence of malignancy.  She was said to be heterogeneously dense discrete masses or suspicious microcalcifications.     4/12/2024.  She is here today to reestablish medical oncology/hematology care.  She says that she feels tired all the time.  She has felt this way for a couple years.  She says that she feels better if she is more active.  She says that her daughter-in-law is in charge of her medications.  She has discomfort everywhere which she attributes to arthritis.  She has not resumed smoking.  She says that she has been abstinent since she had a myocardial infarction when she was 55 years old.  She has had no recent bleeding.  She said that she takes 100 mg of trazodone nightly because the 200 mg pill made her feel weird.  She says that she  does not get a lot of exercise.  She walks back and forth to the bathroom.  She lives alone in a two-story only stays on the lower level.  She says that she does not talk to people daily and she does not have a Lutheran family and she does not have any pets.  Her current weight is 172 pounds 6.4 ounces and her last weight was 171 pounds 8 ounces.  When she was weighed here in 2021 she weighed 182.    Interval Note  4/16/2024.  Thyroid ultrasound showed enlargement of the right lobe compared to the left with at least 5 nodules.  The nodules identified as #2 was 2.2 x 2.1 x 1.8 cm and was almost completely solid.  It was a T RADS 5 which was highly suspicious.  Nodule 1 was said to be T RADS 3 mildly suspicious.  Nodule 3 was T RADS 3 mildly suspicious.  Nodule for was T RADS 4 moderately suspicious.  Nodule 5 was T RADS 3 mildly suspicious.    4/18/2024.  Follow-up with Dr. Lewis will refer her to Dr. Meyers.  He noted that the serum creatinine electrophoresis showed polyclonal gammopathy consistent with inflammation probably related to her known pemphigus.  He treated him 1/2 cm ribbon of epidermal disruption with a course of doxycycline.  He continued her on lifelong Eliquis because of previous DVT.  She also noted her depression for which she was on trazodone.    4/30/2024.  Started on iron infusions with Venofer which she tolerated well.    5/14/2024.  Seen by Queenie Senior from nutrition services who noted that the patient had poor nutritional intake due to the difficulty cooking with back pain.  He offered her Meals on Wheels but the patient declined for now.  Significant weight loss over time was noted and serial weights.  Current weight was 171 pounds.    5/21/2024.  Seen by Dr. Meyers who did not feel any masses and felt that the ultrasound needed to be repeated in a year.    6/7/2024.  She is here in the office for review of her recent events.  We discussed Dr. Meyers's evaluation.  Repeat ultrasound is  scheduled.  She tolerated the iron and feels a little bit better.  She is getting out more and she says that her view of the world is better.  She is looking forward to a graduation party this weekend.    Laboratory data drawn today shows BUN and creatinine are higher than they usually are at and 2.48.  Sugar was 152.  Liver function tests were normal.  Her iron saturation has improved but still is 7 normal.  Her CBC has improved with a white count 7.8, hemoglobin 9.8, up from 9.0 and hematocrit is 32.2 up from 28.  Platelets are normal.  White blood cell differential is normal.    Comorbidities  History of gastrointestinal bleeding  Ibrahim's esophagus  CKD felt to be secondary to diabetic nephropathy with hypertensive nephrosclerosis, followed by Dr. Dove  COPD  Asthma  GERD  Hypertension  Depression  Pemphigus  Degenerative joint disease  Hyperlipidemia  Diabetes mellitus  Coronary artery disease status post myocardial infarction in the 1990s, with left heart cath 7/17/2014.  Anxiety disorder  Psoriasis  Lung nodule  She is status post hysterectomy and lobectomy for low-grade carcinoid tumor 1.0 cm with negative margins and 1 positive lymph node.    Hypothyroidism  History of DVT in left leg around 2018 treated with anticoagulation.  Also had IVC filter placed at that time.  Insomnia    Monitoring Parameters  Histories and physical examinations, CBCs and iron studies     Review of Systems - Oncology   Review of Systems   Constitutional:  Positive for fatigue (a little better). Negative for appetite change and unexpected weight change.   HENT:  Negative for dental problem, mouth sores, rhinorrhea and trouble swallowing.    Eyes:  Negative for visual disturbance.   Respiratory:  Negative for cough and shortness of breath (better).    Cardiovascular:  Positive for leg swelling (better).   Gastrointestinal:  Negative for abdominal pain, constipation, diarrhea, nausea and vomiting.   Endocrine: Negative for polyuria.    Genitourinary:  Negative for frequency.   Musculoskeletal:  Positive for arthralgias and myalgias.   Skin:  Negative for pallor and rash.   Neurological:  Negative for weakness, light-headedness, numbness and headaches.   Hematological:  Does not bruise/bleed easily.   Psychiatric/Behavioral:  Negative for self-injury, sleep disturbance and suicidal ideas. The patient is not nervous/anxious.         Past Medical History  Past Medical History:   Diagnosis Date    Encounter for screening for malignant neoplasm of vagina     Vaginal Pap smear    Old myocardial infarction     History of myocardial infarction    Personal history of other endocrine, nutritional and metabolic disease 06/22/2020    History of diabetes mellitus    Personal history of other medical treatment     H/O mammogram        Surgical History  Past Surgical History:   Procedure Laterality Date    CARDIAC CATHETERIZATION  06/25/2018    Cardiac Cath Procedure Outcome:    HYSTERECTOMY  06/25/2018    Hysterectomy    OTHER SURGICAL HISTORY  06/25/2018    Uterine Surgery    OTHER SURGICAL HISTORY  06/25/2018    IVC Filter Placement W/ Fluorosc Angiogr Guidance W/ IV Contrast    OTHER SURGICAL HISTORY  11/21/2019    Exploratory laparotomy    OTHER SURGICAL HISTORY  09/20/2019    Dilation and curettage    OTHER SURGICAL HISTORY  11/21/2019    Breast surgery    OTHER SURGICAL HISTORY  11/21/2019    Colonoscopy    OTHER SURGICAL HISTORY  11/21/2019    Lung lobectomy       Social History  Social History     Tobacco Use    Smoking status: Never    Smokeless tobacco: Never   Vaping Use    Vaping status: Never Used   Substance Use Topics    Alcohol use: Never    Drug use: Defer        Family History  family history includes Breast cancer (age of onset: 70) in her sister; CVA in her mother; Heart attack in her father; Heart disease in her mother; Hypertension in her brother and sister; Lung cancer in her brother.  Mother had congestive heart failure.  Younger  sister had kidney tumor.  Brother had myocardial infarction.  There is no specific history of bleeding or clotting disorders.       Current Medications  Current Outpatient Medications   Medication Instructions    allopurinol (ZYLOPRIM) 100 mg, oral, Daily    aspirin 81 mg, oral, Daily    betamethasone (augmented) (DIPROLENE) 0.05 %, Topical, 2 times daily    calcitriol (ROCALTROL) 0.25 mcg, oral, Daily    cholecalciferol (Vitamin D-3) 5,000 Units tablet 1 tablet, oral, Daily    Eliquis 2.5 mg, oral, 2 times daily    ferrous sulfate 325 (65 Fe) MG tablet 1 tablet, oral, Daily    fluticasone (Flovent Diskus) 250 mcg/actuation diskus inhaler 1 puff, inhalation, 2 times daily RT    furosemide (LASIX) 40 mg, oral, Daily    methIMAzole (TAPAZOLE) 5 mg, oral, Daily    metoprolol succinate XL (TOPROL-XL) 50 mg, oral, Daily    pantoprazole (PROTONIX) 20 mg, oral, Daily    potassium chloride CR 10 mEq ER tablet 10 mEq, oral, Daily    rosuvastatin (CRESTOR) 20 mg, oral, Nightly    traZODone (DESYREL) 100 mg, oral, Nightly    Ventolin HFA 90 mcg/actuation inhaler 2 puffs, inhalation, Every 6 hours PRN           OARRS Review  I have personally reviewed the OARRS report for Hailee David. I have considered the risks of abuse, dependence, addiction and diversion.  Review of this document shows that she is on no sedatives stimulants or opioids.    Vital Signs  /70 (BP Location: Right arm, Patient Position: Sitting, BP Cuff Size: Adult)   Pulse 78   Temp 36.3 °C (97.3 °F) (Skin)   Resp 16   Wt 77.5 kg (170 lb 12.8 oz)   SpO2 100%   BMI 28.63 kg/m²      Physical Exam  Vitals and nursing note reviewed. Exam conducted with a chaperone present.   Constitutional:       General: She is not in acute distress.     Appearance: She is ill-appearing. She is not toxic-appearing.      Comments: She is a well-developed well-nourished, somewhat pale elderly  female who is unaccompanied.  She seems to be in better spirits  today.  She plans to go visit a friend in the facility.  She has a graduation party to go to this weekend.  Overall she says that she feels better.   HENT:      Head: Normocephalic and atraumatic.      Nose: Nose normal.      Mouth/Throat:      Mouth: Mucous membranes are moist.      Pharynx: Oropharynx is clear.   Eyes:      General: No scleral icterus.     Extraocular Movements: Extraocular movements intact.      Conjunctiva/sclera: Conjunctivae normal.      Pupils: Pupils are equal, round, and reactive to light.   Neck:      Comments: No gross adenopathy.  She has a distinct enlargement of the right side of her thyroid.  Pulmonary:      Effort: Pulmonary effort is normal. No respiratory distress.   Musculoskeletal:         General: Normal range of motion.      Cervical back: Normal range of motion.   Skin:     General: Skin is warm and dry.      Coloration: Skin is pale. Skin is not jaundiced.   Neurological:      General: No focal deficit present.      Mental Status: She is alert and oriented to person, place, and time. Mental status is at baseline.      Motor: No weakness.      Gait: Gait normal.      Comments: She is somewhat hard of hearing   Psychiatric:         Mood and Affect: Mood normal.         Behavior: Behavior normal.         Thought Content: Thought content normal.         Judgment: Judgment normal.       Current Laboratory Data  See narrative for current laboratory data.    Recent Imaging  See narrative for report on thyroid ultrasound.    Pathology  Iron deficiency anemia among other problems contributing to her chronic fatigue. She also has significant renal dysfunction and multiple comorbidities.        Performance Status:  Symptomatic; fully ambulatory    Assessment/Plan    1.  Chronic fatigue.  She has many reasons for this.  She does have iron deficiency anemia and she also has chronic renal failure.  She is depressed and has been living alone and is fairly isolated in her contacts.    With  iron supplementation her blood count is somewhat better.  She is still iron deficient and needs to continue on oral iron.  She has been more socially active over the past month and she is forward thinking about gatherings that she is going to this weekend.  This is a substantial improvement in her affect.    Renal function is not as good as it has been.  I called her and told her to keep hydrated.  I think her kidney function test need to be repeated to make sure that they are improving.     2.  Iron deficiency.  Iron infusion has helped.  She needs to stay on oral iron as well.  She may need erythropoietin injections.  We are going to check an erythropoietin level.    3.  Chronic renal failure.  She may need erythropoietin injections.  We are checking on her erythropoietin level today.   We will discuss the risks of this therapy including stroke, heart attack, deep vein thrombosis and hypertension.     4.  History of hyerthyroidism.  She is on Tapazole.  She has a very prominent right side of her thyroid.  I did order a thyroid ultrasound which showed multiple nodules 1 of which was a T RADS very suspicious.  She has been seen by Dr. Meyers.  She may indeed need a biopsy.     5.  Multiple comorbidities.  These include but are not limited to:  History of gastrointestinal bleeding  Ibrahim's esophagus  CKD  COPD  Asthma  GERD  Hypertension  Depression  Pemphigus  Degenerative joint disease  Hyperlipidemia  Diabetes mellitus  Coronary artery disease status post myocardial infarction in the 1990s  Anxiety disorder  Psoriasis  Lung nodule  She is status post hysterectomy and lobectomy for low-grade carcinoid tumor 1.0 cm with negative margins and 1 positive lymph node.    Hypothyroidism  History of DVT in left leg around 2018 treated with anticoagulation.  Also had IVC filter placed at that time.  Insomnia     6.  Depression.  She is being treated for this with trazodone and I think that this diagnosis is a significant  part of her chronic fatigue.     7.  History of deep vein thrombosis.  She continues on anticoagulation.  She has an IVC filter in place.     8.  Polyclonal gammopathy.  This is reactive rather than a primary problem and can be monitored.     9.  Pemphigus.  She has had this at least since 2017 at which time she had a biopsy.  This flares from time to time she has been hospitalized for complications of this process.      We will see her again to discuss the results of the erythropoietin level and her repeat ultrasound.  She knows to call if she has any change in her status, questions or concerns.       (This note was generated with voice recognition software and may contain errors including spelling, grammar, syntax and misrecognition of what was dictated, that are not fully corrected)   Abbey Jerez MD

## 2024-06-09 LAB — EPO SERPL-ACNC: 9 MU/ML (ref 4–27)

## 2024-06-09 ASSESSMENT — ENCOUNTER SYMPTOMS
ABDOMINAL PAIN: 0
TROUBLE SWALLOWING: 0
FATIGUE: 1
FREQUENCY: 0
LIGHT-HEADEDNESS: 0
NERVOUS/ANXIOUS: 0
ARTHRALGIAS: 1
MYALGIAS: 1
APPETITE CHANGE: 0
HEADACHES: 0
CONSTIPATION: 0
VOMITING: 0
WEAKNESS: 0
SLEEP DISTURBANCE: 0
UNEXPECTED WEIGHT CHANGE: 0
DIARRHEA: 0
SHORTNESS OF BREATH: 0
NUMBNESS: 0
COUGH: 0
BRUISES/BLEEDS EASILY: 0
RHINORRHEA: 0
NAUSEA: 0

## 2024-06-18 DIAGNOSIS — D50.8 OTHER IRON DEFICIENCY ANEMIA: ICD-10-CM

## 2024-06-21 ENCOUNTER — APPOINTMENT (OUTPATIENT)
Dept: HEMATOLOGY/ONCOLOGY | Facility: CLINIC | Age: 79
End: 2024-06-21
Payer: MEDICARE

## 2024-06-24 DIAGNOSIS — F51.01 PRIMARY INSOMNIA: ICD-10-CM

## 2024-06-24 RX ORDER — TRAZODONE HYDROCHLORIDE 100 MG/1
100 TABLET ORAL NIGHTLY
Qty: 90 TABLET | Refills: 3 | Status: SHIPPED | OUTPATIENT
Start: 2024-06-24 | End: 2025-06-24

## 2024-06-26 ENCOUNTER — OFFICE VISIT (OUTPATIENT)
Dept: HEMATOLOGY/ONCOLOGY | Facility: CLINIC | Age: 79
End: 2024-06-26
Payer: MEDICARE

## 2024-06-26 VITALS
WEIGHT: 173.4 LBS | TEMPERATURE: 97.3 F | OXYGEN SATURATION: 100 % | SYSTOLIC BLOOD PRESSURE: 134 MMHG | HEART RATE: 85 BPM | RESPIRATION RATE: 16 BRPM | DIASTOLIC BLOOD PRESSURE: 83 MMHG | BODY MASS INDEX: 29.07 KG/M2

## 2024-06-26 DIAGNOSIS — E04.1 THYROID NODULE: ICD-10-CM

## 2024-06-26 DIAGNOSIS — D47.2 GAMMOPATHY: ICD-10-CM

## 2024-06-26 DIAGNOSIS — R53.82 CHRONIC FATIGUE: ICD-10-CM

## 2024-06-26 DIAGNOSIS — D50.8 OTHER IRON DEFICIENCY ANEMIA: ICD-10-CM

## 2024-06-26 PROCEDURE — 99213 OFFICE O/P EST LOW 20 MIN: CPT | Performed by: INTERNAL MEDICINE

## 2024-06-26 PROCEDURE — 3075F SYST BP GE 130 - 139MM HG: CPT | Performed by: INTERNAL MEDICINE

## 2024-06-26 PROCEDURE — 1126F AMNT PAIN NOTED NONE PRSNT: CPT | Performed by: INTERNAL MEDICINE

## 2024-06-26 PROCEDURE — 1159F MED LIST DOCD IN RCRD: CPT | Performed by: INTERNAL MEDICINE

## 2024-06-26 PROCEDURE — 3079F DIAST BP 80-89 MM HG: CPT | Performed by: INTERNAL MEDICINE

## 2024-06-26 ASSESSMENT — PAIN SCALES - GENERAL: PAINLEVEL: 0-NO PAIN

## 2024-06-26 NOTE — PROGRESS NOTES
Patient ID:Hailee David is a 79 y.o. year old female patient with chronic fatigue and anemia       Referring Physician: Abbey Jerez MD  [address] [place] [address] [address]  [place] [id number]  Primary Care Provider: Devon Lewis MD    Chief Complaint  Chief Complaint   Patient presents with    Gammopathy          History of the Present Illness  9/11/2018.  Seen by Dr. Newell from gastroenterology for iron deficiency anemia.  Hemoglobin had dropped to 7.9.  EGD and colonoscopy showed large hiatal hernia with Ibrahim's esophagus and colonoscopy showed no polyps.  Multiple biopsies have been taken and showed no dysplasia.  She also had benign polyps of the fundus of the stomach.  She was anticoagulated at that time.  Her weight at these visits was 223 pounds.     10/15/2018.  Echocardiogram showed normal left ventricular ejection fraction of 55 to 60% with no significant valvular heart disease.  The study was done because of dyspnea.     6/21/2021.  CBC showed white count 8.0 with a hemoglobin of 8.9, hematocrit 28.4 with an MCV of 86 MCHC of 31.2 and a platelet count 357,000.  Her RDW was 16.2.     8/16/2019.  Seen by Dr. Aba Carias.  He noted the patient had DVT and gastrointestinal bleeding in June 2018.  This required transfusion of several units of blood.  IVC filter was placed at that time.  Ultrasound done on 8/5/2019 showed partially occlusive chronic deep vein thrombosis of the left common femoral veins down to the popliteal vein.  This is consistent with old clot/scar tissue.  He said that she should be kept on this indefinitely, because there was an unprovoked clot.  There was further testing for thrombophilia.     10/8/2019.  BUN was 30 creatinine was 2.31 uric acid was 8.1.  PTH was 208.5.  Hemoglobin was 9.9 hematocrit was 31.6.     11/11/2019 through 11/14/2019.  Hospitalized with shortness of breath with a working diagnosis of acute exacerbation of COPD.  She was treated with systemic steroids  medication nebulizer treatments and respiratory support.  Regular medications including Lasix were continued she did not receive antibiotics.  CBC showed a white count of 9.2 with hemoglobin 10 hematocrit 31.6 and platelet count of 262,000.  BUN had gone up to 40 with a creatinine of 3.18 and peaked at 60/2.53 on 11/14/2019     2/15/2020 through 2/20/2020.  Admitted to the hospital with dizziness, recurrent falls and bilateral knee pain.  At that time she had acute kidney injury and hyperkalemia on the basis of orthostatic hypotension and dehydration.  Nephrology was consulted.  BUN was 28 and creatinine was 4.48.  CBC showed a white count 11,000 with a hemoglobin 11.3 hematocrit 36.1 and a platelet count of 364,000.  By the time of discharge her BUN was 20 and creatinine was 2.11.  Hemoglobin was 9.3 hematocrit 29.7.     3/12/2020.  BUN is 22 creatinine is 2.24.  CBC showed white count 10.4 with a hemoglobin 9.7 hematocrit 30.4 and platelet count 338,000.     2/15/2021.  BUN was 29 creatinine was 2.9.  6/21/2021.  BUN was 27 creatinine was 2.36.  Hemoglobin was 8.9 with hematocrit of 28.4     8/2/2021.  Referred to Dr. Khalil for anemia by Dr. Lewis.  She had had a history of unprovoked bilateral left greater than right lower extremity deep vein thrombosis diagnosed in June 2018.  She had an IVC filter placed and she was treated with Eliquis without any recurrence of bleeding.  She was referred for anemia which he felt was chronic with a hemoglobin ranging from 8-9 with an MCV of 86.  White blood count and platelet counts have been normal.  Her creatinine was significantly elevated at 2.36.  He noted that previous laboratory data in August 2020 showed a ferritin of 37 with a saturation of 12%.  Her B12 level was low normal at 244.  She was treated with oral iron which she tolerated well.  Folate was normal, haptoglobin was elevated, total protein was normal, LDH was normal     Her other complaints included low  "energy.  She says that she would fall asleep in the middle of the day.  She had no signs of infection.  She does have arthritic pain.     His assessment was that she had the anemia of chronic disease but also had iron deficiency we plan to recheck a CBC and iron parameters and give IV iron before consideration of JAVIER treatment.  He discussed the risks and benefits of erythropoietin particularly the cardiovascular risks of stroke and myocardial infarction as well as venous thromboembolic risk.  He also plan to reevaluate her for monoclonal gammopathy.  In reference to her unprovoked bilateral deep vein thrombosis, the plan was to keep her on anticoagulation indefinitely.     9/30/2021 through 10/6/2021.  Admitted to the hospital with cellulitis.  She had a history of pemphigus and presented to the emergency room with fevers, chills, confusion and was found to be septic with lactic acidosis, acute on chronic renal failure, acute metabolic encephalopathy and her hypothyroidism.  She was described to have \"severe angry erosive rash in both groin areas.  By a whitish-cream.  The rash was erythematous and tender to touch.  She was treated with broad-spectrum antibiotics, IV fluids and her Lasix was held.  Stool for occult blood was negative.  Hemoglobins were in the sevens with hematocrit in the low 20s.  White count and platelet count are normal.     10/28/2021.  CBC showed white count 10.2 with hemoglobin 10.5 hematocrit 33.7 and a platelet count of 314,000 with 71 polys 17 lymphs 9 monos and 1 eosinophil.     7/3/2022 through 7/7/2022.  Readmitted to the hospital with acute kidney injury superimposed on chronic kidney disease and urinary tract infection.  She was stabilized and able to be discharged home.  During that hospital stay her hemoglobin dropped again to 7.9 with hematocrit of 24.6.     5/8/2023.  Follow-up with Dr. Lewis.  Patient said that she was up most nights despite taking mirtazapine and trazodone.  " Subsequently mirtazapine was discontinued and trazodone was increased to 200 mg p.o. nightly.  Anemia was more problematic and she was referred back to hematology, but did not make that appointment.  Weight at that time was 175 pounds.     7/3/2023.  CBC showed a white count of 7000 hemoglobin 9.2 hematocrit 29.1 with a platelet count of 284,000 with a normal differential.     3/13/2024.  CBC showed a white count of 6.7 with hemoglobin 8.9 hematocrit 28.6 and a platelet count of 280,000.  Serum chemistry showed a BUN of 38 and creatinine of 2.33 and albumin of 3.3 and normal liver function test.  Her EGFR was down to 21.  TSH was normal.  Uric acid was normal.  Parathyroid hormone level was elevated at 104.9.     3/14/2024.  Laboratory data showed an albumin of 3.0.  She had polyclonal gammopathy consistent with chronic inflammation.     3/18/2024.  Follow-up with Dr. Lewis.  She told her physician that her left breast was larger than the right and sometimes she had pain but there is no mass.  She has occasional dyspepsia.  He referred her to us because of the previous diagnosis of polyclonal gammopathy.  Her weight was down to 171 pounds.     3/27/2024.  Mammogram no evidence of malignancy.  She was said to be heterogeneously dense discrete masses or suspicious microcalcifications.     4/12/2024.  She is here today to reestablish medical oncology/hematology care.  She says that she feels tired all the time.  She has felt this way for a couple years.  She says that she feels better if she is more active.  She says that her daughter-in-law is in charge of her medications.  She has discomfort everywhere which she attributes to arthritis.  She has not resumed smoking.  She says that she has been abstinent since she had a myocardial infarction when she was 55 years old.  She has had no recent bleeding.  She said that she takes 100 mg of trazodone nightly because the 200 mg pill made her feel weird.  She says that she  does not get a lot of exercise.  She walks back and forth to the bathroom.  She lives alone in a two-story only stays on the lower level.  She says that she does not talk to people daily and she does not have a [employer] family and she does not have any pets.  Her current weight is 172 pounds 6.4 ounces and her last weight was 171 pounds 8 ounces.  When she was weighed here in 2021 she weighed 182.     4/16/2024.  Thyroid ultrasound showed enlargement of the right lobe compared to the left with at least 5 nodules.  The nodules identified as #2 was 2.2 x 2.1 x 1.8 cm and was almost completely solid.  It was a T RADS 5 which was highly suspicious.  Nodule 1 was said to be T RADS 3 mildly suspicious.  Nodule 3 was T RADS 3 mildly suspicious.  Nodule for was T RADS 4 moderately suspicious.  Nodule 5 was T RADS 3 mildly suspicious.     4/18/2024.  Follow-up with Dr. Lewis will refer her to Dr. Meyers.  He noted that the serum creatinine electrophoresis showed polyclonal gammopathy consistent with inflammation probably related to her known pemphigus.  He treated him 1/2 cm ribbon of epidermal disruption with a course of doxycycline.  He continued her on lifelong Eliquis because of previous DVT.  She also noted her depression for which she was on trazodone.     4/30/2024.  Started on iron infusions with Venofer which she tolerated well.     5/14/2024.  Seen by Queenie Senior from nutrition services who noted that the patient had poor nutritional intake due to the difficulty cooking with back pain.  He offered her Meals on Wheels but the patient declined for now.  Significant weight loss over time was noted and serial weights.  Current weight was 171 pounds.     5/21/2024.  Seen by Dr. Meyers who did not feel any masses and felt that the ultrasound needed to be repeated in a year.     6/7/2024.  She is here in the office for review of her recent events.  We discussed Dr. Meyers's evaluation.  Repeat ultrasound is scheduled.  She  tolerated the iron and feels a little bit better.  She is getting out more and she says that her view of the world is better.  She is looking forward to a graduation party this weekend.     Laboratory data drawn today shows BUN and creatinine are higher than they usually are at and 2.48.  Sugar was 152.  Liver function tests were normal.  Her iron saturation has improved but still is 7 normal.  Her CBC has improved with a white count 7.8, hemoglobin 9.8, up from 9.0 and hematocrit is 32.2 up from 28.  Platelets are normal.  White blood cell differential is normal.    Interval Note  6/26/2024.  She is here to discuss the possibility of erythropoietin injections.  We mentioned this on her last visit.  I wanted her to consider it further and I have placed several phone calls to her but got no response.  Her laboratory data continues to show an elevated BUN and creatinine.  Her iron saturation has markedly that is still not yet normal.  Her serum iron is normal.  Encouraged her to stay on oral iron.  She is still anemic but is somewhat better with a hemoglobin of 9.8 and a hematocrit of 32.2.    When we discussed the possible side effects of erythropoietin injections, she was cautious.  She did not completely rule out erythropoietin injections but wants to consider it and see if she really needs it.  Right now she is feeling severely improved with the iron infusions.    Comorbidities  History of gastrointestinal bleeding  Ibrahim's esophagus  CKD felt to be secondary to diabetic nephropathy with hypertensive nephrosclerosis, followed by Dr. Dove  COPD  Asthma  GERD  Hypertension  Depression  Pemphigus  Degenerative joint disease  Hyperlipidemia  Diabetes mellitus  Coronary artery disease status post myocardial infarction in the 1990s, with left heart cath 7/17/2014.  Anxiety disorder  Psoriasis  Lung nodule  She is status post hysterectomy and lobectomy for low-grade carcinoid tumor 1.0 cm with negative margins and 1  positive lymph node.    Hypothyroidism  History of DVT in left leg around 2018 treated with anticoagulation.  Also had IVC filter placed at that time.  Insomnia    Monitoring Parameters  Histories and physical examinations, CBCs and iron studies     Review of Systems - Oncology   Review of Systems   Constitutional:  Positive for fatigue. Negative for appetite change and unexpected weight change.   HENT:  Negative for dental problem, nosebleeds and trouble swallowing.    Eyes:  Negative for visual disturbance.   Respiratory:  Negative for cough and shortness of breath.    Cardiovascular:  Negative for leg swelling.   Gastrointestinal:  Negative for abdominal pain, constipation, diarrhea, nausea and vomiting.   Endocrine: Negative for polyuria.   Genitourinary:  Negative for dysuria, frequency and urgency.   Musculoskeletal:  Positive for arthralgias and myalgias.   Skin:  Negative for pallor.   Neurological:  Negative for weakness, light-headedness and headaches.   Hematological:  Bruises/bleeds easily.   Psychiatric/Behavioral:  Negative for self-injury, sleep disturbance and suicidal ideas. The patient is not nervous/anxious.         Past Medical History  Past Medical History:   Diagnosis Date    Encounter for screening for malignant neoplasm of vagina     Vaginal Pap smear    Old myocardial infarction     History of myocardial infarction    Personal history of other endocrine, nutritional and metabolic disease 06/22/2020    History of diabetes mellitus    Personal history of other medical treatment     H/O mammogram        Surgical History  Past Surgical History:   Procedure Laterality Date    CARDIAC CATHETERIZATION  06/25/2018    Cardiac Cath Procedure Outcome:    HYSTERECTOMY  06/25/2018    Hysterectomy    OTHER SURGICAL HISTORY  06/25/2018    Uterine Surgery    OTHER SURGICAL HISTORY  06/25/2018    IVC Filter Placement W/ Fluorosc Angiogr Guidance W/ IV Contrast    OTHER SURGICAL HISTORY  11/21/2019     Exploratory laparotomy    OTHER SURGICAL HISTORY  09/20/2019    Dilation and curettage    OTHER SURGICAL HISTORY  11/21/2019    Breast surgery    OTHER SURGICAL HISTORY  11/21/2019    Colonoscopy    OTHER SURGICAL HISTORY  11/21/2019    Lung lobectomy       Social History  Social History     Tobacco Use    Smoking status: Never    Smokeless tobacco: Never   Vaping Use    Vaping status: Never Used   Substance Use Topics    Alcohol use: Never    Drug use: Defer        Family History  family history includes Breast cancer (age of onset: 70) in her sister; CVA in her mother; Heart attack in her father; Heart disease in her mother; Hypertension in her brother and sister; Lung cancer in her brother. Mother had congestive heart failure. Younger sister had kidney tumor. Brother had myocardial infarction. There is no specific history of bleeding or clotting disorders.     Current Medications  Current Outpatient Medications   Medication Instructions    allopurinol (ZYLOPRIM) 100 mg, oral, Daily    aspirin 81 mg, oral, Daily    betamethasone (augmented) (DIPROLENE) 0.05 %, Topical, 2 times daily    calcitriol (ROCALTROL) 0.25 mcg, oral, Daily    cholecalciferol (Vitamin D-3) 5,000 Units tablet 1 tablet, oral, Daily    Eliquis 2.5 mg, oral, 2 times daily    ferrous sulfate 325 (65 Fe) MG tablet 1 tablet, oral, Daily    fluticasone (Flovent Diskus) 250 mcg/actuation diskus inhaler 1 puff, inhalation, 2 times daily RT    furosemide (LASIX) 40 mg, oral, Daily    methIMAzole (TAPAZOLE) 5 mg, oral, Daily    metoprolol succinate XL (TOPROL-XL) 50 mg, oral, Daily    pantoprazole (PROTONIX) 20 mg, oral, Daily    potassium chloride CR 10 mEq ER tablet 10 mEq, oral, Daily    rosuvastatin (CRESTOR) 20 mg, oral, Nightly    traZODone (DESYREL) 100 mg, oral, Nightly    Ventolin HFA 90 mcg/actuation inhaler 2 puffs, inhalation, Every 6 hours PRN           OARRS Review  I have personally reviewed the OARRS report for Hailee David. I have  considered the risks of abuse, dependence, addiction and diversion    Vital Signs  /83 (BP Location: Right arm, Patient Position: Sitting, BP Cuff Size: Adult)   Pulse 85   Temp 36.3 °C (97.3 °F) (Skin)   Resp 16   Wt 78.7 kg (173 lb 6.4 oz)   SpO2 100%   BMI 29.07 kg/m²      Physical Exam  Vitals and nursing note reviewed. Exam conducted with a chaperone present.   Constitutional:       General: She is not in acute distress.     Appearance: She is not ill-appearing or toxic-appearing.      Comments: She is a well-developed well-nourished, somewhat pale elderly  female who is unaccompanied.   HENT:      Head: Normocephalic and atraumatic.      Nose: Nose normal.      Mouth/Throat:      Mouth: Mucous membranes are moist.   Eyes:      General: No scleral icterus.     Extraocular Movements: Extraocular movements intact.      Conjunctiva/sclera: Conjunctivae normal.      Pupils: Pupils are equal, round, and reactive to light.   Pulmonary:      Effort: Pulmonary effort is normal.   Musculoskeletal:         General: Normal range of motion.      Cervical back: Normal range of motion.      Right lower leg: No edema.      Left lower leg: No edema.   Skin:     General: Skin is warm and dry.      Coloration: Skin is not jaundiced or pale.      Findings: No rash.      Comments: Sallow complexion   Neurological:      General: No focal deficit present.      Mental Status: She is alert and oriented to person, place, and time. Mental status is at baseline.      Motor: No weakness.      Gait: Gait normal.   Psychiatric:         Mood and Affect: Mood normal.         Behavior: Behavior normal.         Thought Content: Thought content normal.         Judgment: Judgment normal.      Comments: She is cautious and she is in good spirits.          Current Laboratory Data  No results found for this or any previous visit (from the past 168 hour(s)).  See narrative for comments on laboratory data  Recent Imaging  No  results found.      Pathology  Iron deficiency anemia among other problems contributing to her chronic fatigue. She also has significant renal dysfunction and multiple comorbidities.        Performance Status:  Symptomatic; fully ambulatory    Assessment/Plan    1.  Chronic fatigue.  She has many reasons for this.  She does have iron deficiency anemia and she also has chronic renal failure.  She is depressed and has been living alone and is fairly isolated in her contacts.     With iron supplementation her blood count is somewhat better.  She is still iron deficient and needs to continue on oral iron.  She has been more socially active over the past month and she is forward thinking about gatherings that she is going to this weekend.  This is a substantial improvement in her affect.     Renal function is not as good as it has been.  I called her and told her to keep hydrated.  I think her kidney function test need to be repeated to make sure that they are improving.     2.  Iron deficiency.  Iron infusion has helped.  She needs to stay on oral iron as well.  She may need erythropoietin injections.  Erythropoietin level is low and she could probably benefit from injections that she is cautious because of the side effects.  She will consider this and let us know whether or not she wants to try injections or she wants to just be followed.    3.  Chronic renal failure.  She may need erythropoietin injections.  We are checking on her erythropoietin level today.   We will discuss the risks of this therapy including stroke, heart attack, deep vein thrombosis and hypertension.  She will follow with nephrology.     4.  History of hyerthyroidism.  She is on Tapazole.  She has a very prominent right side of her thyroid.  I did order a thyroid ultrasound which showed multiple nodules 1 of which was a T RADS very suspicious.  She has been seen by Dr. Meyers.  She may indeed need a biopsy.  She has a follow-up ultrasound coming.      5.  Multiple comorbidities.  These include but are not limited to:  History of gastrointestinal bleeding  Ibrahim's esophagus  CKD  COPD  Asthma  GERD  Hypertension  Depression  Pemphigus  Degenerative joint disease  Hyperlipidemia  Diabetes mellitus  Coronary artery disease status post myocardial infarction in the 1990s  Anxiety disorder  Psoriasis  Lung nodule  She is status post hysterectomy and lobectomy for low-grade carcinoid tumor 1.0 cm with negative margins and 1 positive lymph node.    Hypothyroidism  History of DVT in left leg around 2018 treated with anticoagulation.  Also had IVC filter placed at that time.  Insomnia     6.  Depression.  She is being treated for this with trazodone and I think that this diagnosis is a significant part of her chronic fatigue.     7.  History of deep vein thrombosis.  She continues on anticoagulation.  She has an IVC filter in place.     8.  Polyclonal gammopathy.  This is reactive rather than a primary problem and can be monitored.     9.  Pemphigus.  She has had this at least since 2017 at which time she had a biopsy.  This flares from time to time she has been hospitalized for complications of this process.      She will consider the information has been given to her and  the benefits and side effects.  Will continue to monitor her closely.  She knows to call if she has any change in her status, questions or concerns.  We will see her again in 3 months or sooner as her symptoms dictate.        (This note was generated with voice recognition software and may contain errors including spelling, grammar, syntax and misrecognition of what was dictated, that are not fully corrected)          Abbey Jerez MD

## 2024-06-26 NOTE — PATIENT INSTRUCTIONS
Reviewed labs and recent medical history.  Discussed her last labs and that she is still anemic. Discussed the option of starting her erythropoetin to help increase her red blood cells.  She will consider this option. Information printed off for patient to review. Asked patient to return call to the office if she decides she would like to start this medication.  She will have her Ultrasound on 7/8 as scheduled. Dr. Jerez will call her with the results.  Return to see MD in 3 months to review.

## 2024-06-26 NOTE — PROGRESS NOTES
Information given on epoetin injections- pt will think about it and call me if she wants to start  Rtc 9/25 3pm for MD visit  Reviewed AVS with patient- patient verbalizes understanding

## 2024-06-30 ASSESSMENT — ENCOUNTER SYMPTOMS
COUGH: 0
NAUSEA: 0
APPETITE CHANGE: 0
CONSTIPATION: 0
FATIGUE: 1
VOMITING: 0
MYALGIAS: 1
DYSURIA: 0
FREQUENCY: 0
NERVOUS/ANXIOUS: 0
UNEXPECTED WEIGHT CHANGE: 0
DIARRHEA: 0
LIGHT-HEADEDNESS: 0
TROUBLE SWALLOWING: 0
BRUISES/BLEEDS EASILY: 1
HEADACHES: 0
SLEEP DISTURBANCE: 0
SHORTNESS OF BREATH: 0
WEAKNESS: 0
ARTHRALGIAS: 1
ABDOMINAL PAIN: 0

## 2024-07-02 DIAGNOSIS — I82.5Y2 CHRONIC DEEP VEIN THROMBOSIS (DVT) OF PROXIMAL VEIN OF LEFT LOWER EXTREMITY (MULTI): ICD-10-CM

## 2024-07-02 RX ORDER — APIXABAN 2.5 MG/1
2.5 TABLET, FILM COATED ORAL 2 TIMES DAILY
Qty: 180 TABLET | Refills: 3 | Status: SHIPPED | OUTPATIENT
Start: 2024-07-02

## 2024-07-08 ENCOUNTER — HOSPITAL ENCOUNTER (OUTPATIENT)
Dept: RADIOLOGY | Facility: HOSPITAL | Age: 79
Discharge: HOME | End: 2024-07-08
Payer: MEDICARE

## 2024-07-08 DIAGNOSIS — E04.1 THYROID NODULE: ICD-10-CM

## 2024-07-08 PROCEDURE — 76536 US EXAM OF HEAD AND NECK: CPT | Performed by: RADIOLOGY

## 2024-07-08 PROCEDURE — 76536 US EXAM OF HEAD AND NECK: CPT

## 2024-07-12 ENCOUNTER — TELEPHONE (OUTPATIENT)
Dept: HEMATOLOGY/ONCOLOGY | Facility: CLINIC | Age: 79
End: 2024-07-12
Payer: MEDICARE

## 2024-07-17 ENCOUNTER — APPOINTMENT (OUTPATIENT)
Dept: HEMATOLOGY/ONCOLOGY | Facility: CLINIC | Age: 79
End: 2024-07-17
Payer: MEDICARE

## 2024-07-23 ENCOUNTER — APPOINTMENT (OUTPATIENT)
Dept: PRIMARY CARE | Facility: CLINIC | Age: 79
End: 2024-07-23
Payer: MEDICARE

## 2024-07-23 VITALS
WEIGHT: 174.4 LBS | OXYGEN SATURATION: 97 % | HEART RATE: 103 BPM | SYSTOLIC BLOOD PRESSURE: 138 MMHG | BODY MASS INDEX: 29.06 KG/M2 | HEIGHT: 65 IN | DIASTOLIC BLOOD PRESSURE: 60 MMHG

## 2024-07-23 DIAGNOSIS — Z00.00 ROUTINE GENERAL MEDICAL EXAMINATION AT HEALTH CARE FACILITY: Primary | ICD-10-CM

## 2024-07-23 DIAGNOSIS — K22.70 BARRETT'S ESOPHAGUS WITHOUT DYSPLASIA: ICD-10-CM

## 2024-07-23 DIAGNOSIS — I10 PRIMARY HYPERTENSION: ICD-10-CM

## 2024-07-23 DIAGNOSIS — E04.2 MULTIPLE THYROID NODULES: ICD-10-CM

## 2024-07-23 DIAGNOSIS — D64.9 ANEMIA, UNSPECIFIED TYPE: ICD-10-CM

## 2024-07-23 PROCEDURE — 1160F RVW MEDS BY RX/DR IN RCRD: CPT | Performed by: FAMILY MEDICINE

## 2024-07-23 PROCEDURE — 1124F ACP DISCUSS-NO DSCNMKR DOCD: CPT | Performed by: FAMILY MEDICINE

## 2024-07-23 PROCEDURE — 1036F TOBACCO NON-USER: CPT | Performed by: FAMILY MEDICINE

## 2024-07-23 PROCEDURE — G0439 PPPS, SUBSEQ VISIT: HCPCS | Performed by: FAMILY MEDICINE

## 2024-07-23 PROCEDURE — 1170F FXNL STATUS ASSESSED: CPT | Performed by: FAMILY MEDICINE

## 2024-07-23 PROCEDURE — 3078F DIAST BP <80 MM HG: CPT | Performed by: FAMILY MEDICINE

## 2024-07-23 PROCEDURE — 99214 OFFICE O/P EST MOD 30 MIN: CPT | Performed by: FAMILY MEDICINE

## 2024-07-23 PROCEDURE — 1159F MED LIST DOCD IN RCRD: CPT | Performed by: FAMILY MEDICINE

## 2024-07-23 PROCEDURE — 3075F SYST BP GE 130 - 139MM HG: CPT | Performed by: FAMILY MEDICINE

## 2024-07-23 ASSESSMENT — PATIENT HEALTH QUESTIONNAIRE - PHQ9
SUM OF ALL RESPONSES TO PHQ9 QUESTIONS 1 AND 2: 1
1. LITTLE INTEREST OR PLEASURE IN DOING THINGS: SEVERAL DAYS
2. FEELING DOWN, DEPRESSED OR HOPELESS: NOT AT ALL

## 2024-07-23 ASSESSMENT — ENCOUNTER SYMPTOMS
DEPRESSION: 0
OCCASIONAL FEELINGS OF UNSTEADINESS: 0
LOSS OF SENSATION IN FEET: 0

## 2024-07-23 ASSESSMENT — ACTIVITIES OF DAILY LIVING (ADL)
TAKING_MEDICATION: INDEPENDENT
MANAGING_FINANCES: INDEPENDENT
DRESSING: INDEPENDENT
DOING_HOUSEWORK: INDEPENDENT
GROCERY_SHOPPING: NEEDS ASSISTANCE
BATHING: INDEPENDENT

## 2024-07-23 NOTE — PROGRESS NOTES
"Subjective   Reason for Visit: Hailee David is an 79 y.o. female here for a Medicare Wellness visit.     Past Medical, Surgical, and Family History reviewed and updated in chart.    Reviewed all medications by prescribing practitioner or clinical pharmacist (such as prescriptions, OTCs, herbal therapies and supplements) and documented in the medical record.    HPI  Since the last office visit there have been no interval operations, hospitalizations, important illnesses or injuries.  Dr Jerez- repeated US, as not convincedok as ent suggested  Barretts- 6 yrssince egd.  Will refer  Patient Care Team:  Devon Lewis MD as PCP - General  Devon Lewis MD as PCP - [employer] PCP  Abbey Jerez MD as Consulting Physician (Hematology and Oncology)     Review of Systems  General-no fatigue weight to within 10 pounds  ENT no problems with vision swallowing  Cardiac no chest pains palpitations change in exercise tolerance or capacity  Pulmonary no cough shortness of breath  GI no heartburn or abdominal pain  Musculoskeletal no joint pains  Objective   Vitals:  /60   Pulse 103   Ht 1.645 m (5' 4.76\")   Wt 79.1 kg (174 lb 6.4 oz)   SpO2 97%   BMI 29.24 kg/m²       Physical Exam  General:  Alert, No acute distress. Appears stated age  Eye:  Pupils are equal, round and reactive to light, Extraocular movements are intact, Normal conjunctiva.    Neck:  Supple, Non-tender, No carotid bruit, No jugular venous distention, No lymphadenopathy, No thyromegaly.    Respiratory:  Lungs are clear to auscultation, Respirations are non-labored, Breath sounds are equal.    Cardiovascular:  Normal rate, Regular rhythm, No murmur.    Gastrointestinal:  Soft, Non-tender, No organomegaly. No solid or pulsatile mass  Integumentary:  Warm, Dry. No concerning lesions on exposed areas  Neurologic:  Alert, Oriented.  Gross and fine motor intact, CN 2-12 intact  Psychiatric:  Cooperative, Appropriate mood & " affect.    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

## 2024-08-26 DIAGNOSIS — K21.9 GASTROESOPHAGEAL REFLUX DISEASE WITHOUT ESOPHAGITIS: ICD-10-CM

## 2024-08-26 DIAGNOSIS — Z00.00 ROUTINE GENERAL MEDICAL EXAMINATION AT A HEALTH CARE FACILITY: Primary | ICD-10-CM

## 2024-08-26 RX ORDER — PANTOPRAZOLE SODIUM 20 MG/1
20 TABLET, DELAYED RELEASE ORAL DAILY
Qty: 90 TABLET | Refills: 3 | Status: SHIPPED | OUTPATIENT
Start: 2024-08-26 | End: 2025-08-26

## 2024-08-26 RX ORDER — FUROSEMIDE 40 MG/1
40 TABLET ORAL DAILY
Qty: 90 TABLET | Refills: 3 | Status: SHIPPED | OUTPATIENT
Start: 2024-08-26 | End: 2025-08-26

## 2024-08-26 RX ORDER — CALCITRIOL 0.25 UG/1
0.25 CAPSULE ORAL DAILY
Qty: 90 CAPSULE | Refills: 3 | Status: SHIPPED | OUTPATIENT
Start: 2024-08-26 | End: 2025-08-26

## 2024-09-25 ENCOUNTER — OFFICE VISIT (OUTPATIENT)
Dept: HEMATOLOGY/ONCOLOGY | Facility: CLINIC | Age: 79
End: 2024-09-25
Payer: MEDICARE

## 2024-09-25 VITALS
SYSTOLIC BLOOD PRESSURE: 150 MMHG | HEART RATE: 107 BPM | BODY MASS INDEX: 29.51 KG/M2 | TEMPERATURE: 97.9 F | RESPIRATION RATE: 20 BRPM | DIASTOLIC BLOOD PRESSURE: 81 MMHG | WEIGHT: 176 LBS | OXYGEN SATURATION: 99 %

## 2024-09-25 DIAGNOSIS — D47.2 GAMMOPATHY: ICD-10-CM

## 2024-09-25 DIAGNOSIS — R53.82 CHRONIC FATIGUE: ICD-10-CM

## 2024-09-25 DIAGNOSIS — E04.1 THYROID NODULE: ICD-10-CM

## 2024-09-25 DIAGNOSIS — D50.8 OTHER IRON DEFICIENCY ANEMIA: ICD-10-CM

## 2024-09-25 LAB
ALBUMIN SERPL BCP-MCNC: 3.5 G/DL (ref 3.4–5)
ALP SERPL-CCNC: 94 U/L (ref 33–136)
ALT SERPL W P-5'-P-CCNC: 7 U/L (ref 7–45)
ANION GAP SERPL CALC-SCNC: 10 MMOL/L (ref 10–20)
AST SERPL W P-5'-P-CCNC: 12 U/L (ref 9–39)
BASOPHILS # BLD AUTO: 0.05 X10*3/UL (ref 0–0.1)
BASOPHILS NFR BLD AUTO: 0.8 %
BILIRUB SERPL-MCNC: 0.3 MG/DL (ref 0–1.2)
BUN SERPL-MCNC: 24 MG/DL (ref 6–23)
CALCIUM SERPL-MCNC: 9 MG/DL (ref 8.6–10.3)
CHLORIDE SERPL-SCNC: 105 MMOL/L (ref 98–107)
CO2 SERPL-SCNC: 28 MMOL/L (ref 21–32)
CREAT SERPL-MCNC: 2.15 MG/DL (ref 0.5–1.05)
EGFRCR SERPLBLD CKD-EPI 2021: 23 ML/MIN/1.73M*2
EOSINOPHIL # BLD AUTO: 0.25 X10*3/UL (ref 0–0.4)
EOSINOPHIL NFR BLD AUTO: 3.9 %
ERYTHROCYTE [DISTWIDTH] IN BLOOD BY AUTOMATED COUNT: 14 % (ref 11.5–14.5)
FOLATE SERPL-MCNC: 8.3 NG/ML
GLUCOSE SERPL-MCNC: 112 MG/DL (ref 74–99)
HCT VFR BLD AUTO: 32.6 % (ref 36–46)
HGB BLD-MCNC: 10.1 G/DL (ref 12–16)
IMM GRANULOCYTES # BLD AUTO: 0.02 X10*3/UL (ref 0–0.5)
IMM GRANULOCYTES NFR BLD AUTO: 0.3 % (ref 0–0.9)
IRON SATN MFR SERPL: 21 % (ref 25–45)
IRON SERPL-MCNC: 48 UG/DL (ref 35–150)
LYMPHOCYTES # BLD AUTO: 1.04 X10*3/UL (ref 0.8–3)
LYMPHOCYTES NFR BLD AUTO: 16.2 %
MCH RBC QN AUTO: 28.9 PG (ref 26–34)
MCHC RBC AUTO-ENTMCNC: 31 G/DL (ref 32–36)
MCV RBC AUTO: 93 FL (ref 80–100)
MONOCYTES # BLD AUTO: 0.53 X10*3/UL (ref 0.05–0.8)
MONOCYTES NFR BLD AUTO: 8.3 %
NEUTROPHILS # BLD AUTO: 4.52 X10*3/UL (ref 1.6–5.5)
NEUTROPHILS NFR BLD AUTO: 70.5 %
NRBC BLD-RTO: 0 /100 WBCS (ref 0–0)
PLATELET # BLD AUTO: 246 X10*3/UL (ref 150–450)
POTASSIUM SERPL-SCNC: 3.7 MMOL/L (ref 3.5–5.3)
PROT SERPL-MCNC: 6.9 G/DL (ref 6.4–8.2)
RBC # BLD AUTO: 3.5 X10*6/UL (ref 4–5.2)
SODIUM SERPL-SCNC: 139 MMOL/L (ref 136–145)
TIBC SERPL-MCNC: 225 UG/DL (ref 240–445)
UIBC SERPL-MCNC: 177 UG/DL (ref 110–370)
VIT B12 SERPL-MCNC: 287 PG/ML (ref 211–911)
WBC # BLD AUTO: 6.4 X10*3/UL (ref 4.4–11.3)

## 2024-09-25 PROCEDURE — 85025 COMPLETE CBC W/AUTO DIFF WBC: CPT | Performed by: INTERNAL MEDICINE

## 2024-09-25 PROCEDURE — 36415 COLL VENOUS BLD VENIPUNCTURE: CPT

## 2024-09-25 PROCEDURE — 3077F SYST BP >= 140 MM HG: CPT | Performed by: INTERNAL MEDICINE

## 2024-09-25 PROCEDURE — 99213 OFFICE O/P EST LOW 20 MIN: CPT | Performed by: INTERNAL MEDICINE

## 2024-09-25 PROCEDURE — 1126F AMNT PAIN NOTED NONE PRSNT: CPT | Performed by: INTERNAL MEDICINE

## 2024-09-25 PROCEDURE — 83540 ASSAY OF IRON: CPT | Performed by: INTERNAL MEDICINE

## 2024-09-25 PROCEDURE — 1159F MED LIST DOCD IN RCRD: CPT | Performed by: INTERNAL MEDICINE

## 2024-09-25 PROCEDURE — 3079F DIAST BP 80-89 MM HG: CPT | Performed by: INTERNAL MEDICINE

## 2024-09-25 PROCEDURE — 82746 ASSAY OF FOLIC ACID SERUM: CPT | Performed by: INTERNAL MEDICINE

## 2024-09-25 PROCEDURE — 82607 VITAMIN B-12: CPT | Performed by: INTERNAL MEDICINE

## 2024-09-25 PROCEDURE — 84075 ASSAY ALKALINE PHOSPHATASE: CPT | Performed by: INTERNAL MEDICINE

## 2024-09-25 ASSESSMENT — PAIN SCALES - GENERAL: PAINLEVEL: 0-NO PAIN

## 2024-09-25 NOTE — PROGRESS NOTES
Labs drawn at clinic visit- Dr Jerez will call her with the results  Rtc 3/26 3pm for MD visit  Reviewed AVS with patient- patient verbalizes understanding

## 2024-09-25 NOTE — PROGRESS NOTES
Patient ID:Hailee David is a 79 y.o. year old female patient with chronic fatigue and anemia    Referring Physician: Abbey Jerez MD  [address] [place] [address] [address]  [place] [id number]  Primary Care Provider: Devon Lewis MD    Chief Complaint  Chief Complaint   Patient presents with    Gammopathy          History of the Present Illness  9/11/2018.  Seen by Dr. Newell from gastroenterology for iron deficiency anemia.  Hemoglobin had dropped to 7.9.  EGD and colonoscopy showed large hiatal hernia with Ibrahim's esophagus and colonoscopy showed no polyps.  Multiple biopsies have been taken and showed no dysplasia.  She also had benign polyps of the fundus of the stomach.  She was anticoagulated at that time.  Her weight at these visits was 223 pounds.     10/15/2018.  Echocardiogram showed normal left ventricular ejection fraction of 55 to 60% with no significant valvular heart disease.  The study was done because of dyspnea.     6/21/2021.  CBC showed white count 8.0 with a hemoglobin of 8.9, hematocrit 28.4 with an MCV of 86 MCHC of 31.2 and a platelet count 357,000.  Her RDW was 16.2.     8/16/2019.  Seen by Dr. Aba Carias.  He noted the patient had DVT and gastrointestinal bleeding in June 2018.  This required transfusion of several units of blood.  IVC filter was placed at that time.  Ultrasound done on 8/5/2019 showed partially occlusive chronic deep vein thrombosis of the left common femoral veins down to the popliteal vein.  This is consistent with old clot/scar tissue.  He said that she should be kept on this indefinitely, because there was an unprovoked clot.  There was further testing for thrombophilia.     10/8/2019.  BUN was 30 creatinine was 2.31 uric acid was 8.1.  PTH was 208.5.  Hemoglobin was 9.9 hematocrit was 31.6.     11/11/2019 through 11/14/2019.  Hospitalized with shortness of breath with a working diagnosis of acute exacerbation of COPD.  She was treated with systemic steroids  medication nebulizer treatments and respiratory support.  Regular medications including Lasix were continued she did not receive antibiotics.  CBC showed a white count of 9.2 with hemoglobin 10 hematocrit 31.6 and platelet count of 262,000.  BUN had gone up to 40 with a creatinine of 3.18 and peaked at 60/2.53 on 11/14/2019     2/15/2020 through 2/20/2020.  Admitted to the hospital with dizziness, recurrent falls and bilateral knee pain.  At that time she had acute kidney injury and hyperkalemia on the basis of orthostatic hypotension and dehydration.  Nephrology was consulted.  BUN was 28 and creatinine was 4.48.  CBC showed a white count 11,000 with a hemoglobin 11.3 hematocrit 36.1 and a platelet count of 364,000.  By the time of discharge her BUN was 20 and creatinine was 2.11.  Hemoglobin was 9.3 hematocrit 29.7.     3/12/2020.  BUN is 22 creatinine is 2.24.  CBC showed white count 10.4 with a hemoglobin 9.7 hematocrit 30.4 and platelet count 338,000.     2/15/2021.  BUN was 29 creatinine was 2.9.  6/21/2021.  BUN was 27 creatinine was 2.36.  Hemoglobin was 8.9 with hematocrit of 28.4     8/2/2021.  Referred to Dr. Khalil for anemia by Dr. Lewis.  She had had a history of unprovoked bilateral left greater than right lower extremity deep vein thrombosis diagnosed in June 2018.  She had an IVC filter placed and she was treated with Eliquis without any recurrence of bleeding.  She was referred for anemia which he felt was chronic with a hemoglobin ranging from 8-9 with an MCV of 86.  White blood count and platelet counts have been normal.  Her creatinine was significantly elevated at 2.36.  He noted that previous laboratory data in August 2020 showed a ferritin of 37 with a saturation of 12%.  Her B12 level was low normal at 244.  She was treated with oral iron which she tolerated well.  Folate was normal, haptoglobin was elevated, total protein was normal, LDH was normal     Her other complaints included low  "energy.  She says that she would fall asleep in the middle of the day.  She had no signs of infection.  She does have arthritic pain.     His assessment was that she had the anemia of chronic disease but also had iron deficiency we plan to recheck a CBC and iron parameters and give IV iron before consideration of JAVIER treatment.  He discussed the risks and benefits of erythropoietin particularly the cardiovascular risks of stroke and myocardial infarction as well as venous thromboembolic risk.  He also plan to reevaluate her for monoclonal gammopathy.  In reference to her unprovoked bilateral deep vein thrombosis, the plan was to keep her on anticoagulation indefinitely.     9/30/2021 through 10/6/2021.  Admitted to the hospital with cellulitis.  She had a history of pemphigus and presented to the emergency room with fevers, chills, confusion and was found to be septic with lactic acidosis, acute on chronic renal failure, acute metabolic encephalopathy and her hypothyroidism.  She was described to have \"severe angry erosive rash in both groin areas.  By a whitish-cream.  The rash was erythematous and tender to touch.  She was treated with broad-spectrum antibiotics, IV fluids and her Lasix was held.  Stool for occult blood was negative.  Hemoglobins were in the sevens with hematocrit in the low 20s.  White count and platelet count are normal.     10/28/2021.  CBC showed white count 10.2 with hemoglobin 10.5 hematocrit 33.7 and a platelet count of 314,000 with 71 polys 17 lymphs 9 monos and 1 eosinophil.     7/3/2022 through 7/7/2022.  Readmitted to the hospital with acute kidney injury superimposed on chronic kidney disease and urinary tract infection.  She was stabilized and able to be discharged home.  During that hospital stay her hemoglobin dropped again to 7.9 with hematocrit of 24.6.     5/8/2023.  Follow-up with Dr. Lewis.  Patient said that she was up most nights despite taking mirtazapine and trazodone.  " Subsequently mirtazapine was discontinued and trazodone was increased to 200 mg p.o. nightly.  Anemia was more problematic and she was referred back to hematology, but did not make that appointment.  Weight at that time was 175 pounds.     7/3/2023.  CBC showed a white count of 7000 hemoglobin 9.2 hematocrit 29.1 with a platelet count of 284,000 with a normal differential.     3/13/2024.  CBC showed a white count of 6.7 with hemoglobin 8.9 hematocrit 28.6 and a platelet count of 280,000.  Serum chemistry showed a BUN of 38 and creatinine of 2.33 and albumin of 3.3 and normal liver function test.  Her EGFR was down to 21.  TSH was normal.  Uric acid was normal.  Parathyroid hormone level was elevated at 104.9.     3/14/2024.  Laboratory data showed an albumin of 3.0.  She had polyclonal gammopathy consistent with chronic inflammation.     3/18/2024.  Follow-up with Dr. Lewis.  She told her physician that her left breast was larger than the right and sometimes she had pain but there is no mass.  She has occasional dyspepsia.  He referred her to us because of the previous diagnosis of polyclonal gammopathy.  Her weight was down to 171 pounds.     3/27/2024.  Mammogram no evidence of malignancy.  She was said to be heterogeneously dense discrete masses or suspicious microcalcifications.     4/12/2024.  She is here today to reestablish medical oncology/hematology care.  She says that she feels tired all the time.  She has felt this way for a couple years.  She says that she feels better if she is more active.  She says that her daughter-in-law is in charge of her medications.  She has discomfort everywhere which she attributes to arthritis.  She has not resumed smoking.  She says that she has been abstinent since she had a myocardial infarction when she was 55 years old.  She has had no recent bleeding.  She said that she takes 100 mg of trazodone nightly because the 200 mg pill made her feel weird.  She says that she  does not get a lot of exercise.  She walks back and forth to the bathroom.  She lives alone in a two-story only stays on the lower level.  She says that she does not talk to people daily and she does not have a Yarsanism family and she does not have any pets.  Her current weight is 172 pounds 6.4 ounces and her last weight was 171 pounds 8 ounces.  When she was weighed here in 2021 she weighed 182.     4/16/2024.  Thyroid ultrasound showed enlargement of the right lobe compared to the left with at least 5 nodules.  The nodules identified as #2 was 2.2 x 2.1 x 1.8 cm and was almost completely solid.  It was a T RADS 5 which was highly suspicious.  Nodule 1 was said to be T RADS 3 mildly suspicious.  Nodule 3 was T RADS 3 mildly suspicious.  Nodule for was T RADS 4 moderately suspicious.  Nodule 5 was T RADS 3 mildly suspicious.     4/18/2024.  Follow-up with Dr. Lewis will refer her to Dr. Meyers.  He noted that the serum creatinine electrophoresis showed polyclonal gammopathy consistent with inflammation probably related to her known pemphigus.  He treated him 1/2 cm ribbon of epidermal disruption with a course of doxycycline.  He continued her on lifelong Eliquis because of previous DVT.  She also noted her depression for which she was on trazodone.     4/30/2024.  Started on iron infusions with Venofer which she tolerated well.     5/14/2024.  Seen by Queenie Senior from nutrition services who noted that the patient had poor nutritional intake due to the difficulty cooking with back pain.  He offered her Meals on Wheels but the patient declined for now.  Significant weight loss over time was noted and serial weights.  Current weight was 171 pounds.     5/21/2024.  Seen by Dr. Meyers who did not feel any masses and felt that the ultrasound needed to be repeated in a year.     6/7/2024.  She is here in the office for review of her recent events.  We discussed Dr. Meyers's evaluation.  Repeat ultrasound is scheduled.  She  tolerated the iron and feels a little bit better.  She is getting out more and she says that her view of the world is better.  She is looking forward to a graduation party this weekend.     Laboratory data drawn today shows BUN and creatinine are higher than they usually are at and 2.48.  Sugar was 152.  Liver function tests were normal.  Her iron saturation has improved but still is 7 normal.  Her CBC has improved with a white count 7.8, hemoglobin 9.8, up from 9.0 and hematocrit is 32.2 up from 28.  Platelets are normal.  White blood cell differential is normal.     6/26/2024.  She is here to discuss the possibility of erythropoietin injections.  We mentioned this on her last visit.  I wanted her to consider it further and I have placed several phone calls to her but got no response.  Her laboratory data continues to show an elevated BUN and creatinine.  Her iron saturation has markedly that is still not yet normal.  Her serum iron is normal.  Encouraged her to stay on oral iron.  She is still anemic but is somewhat better with a hemoglobin of 9.8 and a hematocrit of 32.2.     When we discussed the possible side effects of erythropoietin injections, she was cautious.  She did not completely rule out erythropoietin injections but wants to consider it and see if she really needs it.  Right now she is feeling severely improved with the iron infusions.    Interval Note  7/8/2024.  Thyroid ultrasound    7/23/2024.  Follow-up with Dr. Lewis who referred her to GI for her Ibrahim's esophagus follow-up.    9/25/2024.  She is here for routine follow-up.  She says that her energy level is okay.  She does not do much.  She tends to be solitary.  She says that her allergies have been acting up.  She has had a lot of watery itchy eyes.  I recommended that she use an over-the-counter allergy pill.  She says that she has done this in the past.  Her appetite has not been very good but she has gained a couple pounds the last  time she was here.  She says that she has headaches when she gets a urinary tract infection.  She has had no bleeding she has had no infections.    Laboratory data that was drawn today showed slight improvement in her creatinine from 2.48 now at 2.14 with an EGFR going from 19-23.  Liver function tests were normal.  Her iron saturation is about the same at 21%.  Hemoglobin is now 10.1 up from 9.8 and hematocrit is 32.6 up from 32.2.  Platelet count is normal at 246 and white blood cell differential is normal.    I encouraged her to be more active she has friends and family.  She notes that she feels better when she is more connected.  She will return to the office in about 6 months.  She knows to call in the interim should she have any change.    Comorbidities  History of gastrointestinal bleeding  Ibrahim's esophagus  CKD felt to be secondary to diabetic nephropathy with hypertensive nephrosclerosis, followed by Dr. Dove  COPD  Asthma  GERD  Hypertension  Depression  Pemphigus  Degenerative joint disease  Hyperlipidemia  Diabetes mellitus  Coronary artery disease status post myocardial infarction in the 1990s, with left heart cath 7/17/2014.  Anxiety disorder  Psoriasis  Lung nodule  She is status post hysterectomy and lobectomy for low-grade carcinoid tumor 1.0 cm with negative margins and 1 positive lymph node.    Hypothyroidism  History of DVT in left leg around 2018 treated with anticoagulation.  Also had IVC filter placed at that time.  Insomnia    Monitoring Parameters  Histories and physical examinations, CBCs and iron studies     Review of Systems - Oncology   Review of Systems   Constitutional:  Positive for fatigue. Negative for appetite change and unexpected weight change.   HENT:  Positive for mouth sores. Negative for dental problem, rhinorrhea and trouble swallowing.    Eyes:  Negative for visual disturbance.   Respiratory:  Negative for cough and shortness of breath (gets winded sometimes).     Cardiovascular:  Negative for palpitations and leg swelling.   Gastrointestinal:  Negative for abdominal pain, constipation, diarrhea, nausea and vomiting.   Endocrine: Negative for polyuria.   Genitourinary:  Positive for hematuria. Negative for dysuria and urgency.   Musculoskeletal:  Positive for arthralgias and myalgias.   Skin:  Positive for rash. Negative for pallor.   Neurological:  Positive for weakness. Negative for light-headedness, numbness and headaches.   Hematological:  Bruises/bleeds easily.   Psychiatric/Behavioral:  Negative for self-injury, sleep disturbance and suicidal ideas. The patient is not nervous/anxious.         Past Medical History  Past Medical History:   Diagnosis Date    Encounter for screening for malignant neoplasm of vagina     Vaginal Pap smear    Old myocardial infarction     History of myocardial infarction    Personal history of other endocrine, nutritional and metabolic disease 06/22/2020    History of diabetes mellitus    Personal history of other medical treatment     H/O mammogram        Surgical History  Past Surgical History:   Procedure Laterality Date    CARDIAC CATHETERIZATION  06/25/2018    Cardiac Cath Procedure Outcome:    HYSTERECTOMY  06/25/2018    Hysterectomy    OTHER SURGICAL HISTORY  06/25/2018    Uterine Surgery    OTHER SURGICAL HISTORY  06/25/2018    IVC Filter Placement W/ Fluorosc Angiogr Guidance W/ IV Contrast    OTHER SURGICAL HISTORY  11/21/2019    Exploratory laparotomy    OTHER SURGICAL HISTORY  09/20/2019    Dilation and curettage    OTHER SURGICAL HISTORY  11/21/2019    Breast surgery    OTHER SURGICAL HISTORY  11/21/2019    Colonoscopy    OTHER SURGICAL HISTORY  11/21/2019    Lung lobectomy       Social History  Social History     Tobacco Use    Smoking status: Never    Smokeless tobacco: Never   Vaping Use    Vaping status: Never Used   Substance Use Topics    Alcohol use: Never    Drug use: Defer        Family History  family history includes  Breast cancer (age of onset: 70) in her sister; CVA in her mother; Heart attack in her father; Heart disease in her mother; Hypertension in her brother and sister; Lung cancer in her brother.       Current Medications  Current Outpatient Medications   Medication Instructions    allopurinol (ZYLOPRIM) 100 mg, oral, Daily    aspirin 81 mg, oral, Daily    betamethasone (augmented) (DIPROLENE) 0.05 %, Topical, 2 times daily    calcitriol (ROCALTROL) 0.25 mcg, oral, Daily    cholecalciferol (Vitamin D-3) 5,000 Units tablet 1 tablet, oral, Daily    Eliquis 2.5 mg, oral, 2 times daily    ferrous sulfate 325 (65 Fe) MG tablet 1 tablet, oral, Daily    furosemide (LASIX) 40 mg, oral, Daily    methIMAzole (TAPAZOLE) 5 mg, oral, Daily    metoprolol succinate XL (TOPROL-XL) 50 mg, oral, Daily    pantoprazole (PROTONIX) 20 mg, oral, Daily    potassium chloride CR 10 mEq ER tablet 10 mEq, oral, Daily    rosuvastatin (CRESTOR) 20 mg, oral, Nightly    traZODone (DESYREL) 100 mg, oral, Nightly    Ventolin HFA 90 mcg/actuation inhaler 2 puffs, inhalation, Every 6 hours PRN           OARRS Review  I have personally reviewed the OARRS report for Hailee David. I have considered the risks of abuse, dependence, addiction and diversion.  There are no prescriptions for sedatives stimulants or opioids    Vital Signs  /81 (BP Location: Left arm, Patient Position: Sitting, BP Cuff Size: Adult)   Pulse 107   Temp 36.6 °C (97.9 °F) (Skin)   Resp 20   Wt 79.8 kg (176 lb)   SpO2 99%   BMI 29.51 kg/m²      Physical Exam  Vitals and nursing note reviewed. Exam conducted with a chaperone present.   Constitutional:       General: She is not in acute distress.     Appearance: She is ill-appearing. She is not toxic-appearing.      Comments: She is a well-developed well-nourished, somewhat pale elderly  female who is unaccompanied.  She has a sad affect   HENT:      Head: Normocephalic and atraumatic.      Nose: Nose normal.       Mouth/Throat:      Mouth: Mucous membranes are moist.      Pharynx: No oropharyngeal exudate or posterior oropharyngeal erythema.      Comments: She has lesions in her mouth which she says are consistent with pemphigus.  She also has it in her nose.  Eyes:      General: No scleral icterus.     Extraocular Movements: Extraocular movements intact.      Conjunctiva/sclera: Conjunctivae normal.      Pupils: Pupils are equal, round, and reactive to light.   Neck:      Comments: There is no significant lymphadenopathy in the head neck region, supraclavicular or axillary areas bilaterally.  Cardiovascular:      Rate and Rhythm: Regular rhythm. Tachycardia present.      Heart sounds: No murmur heard.  Pulmonary:      Effort: Pulmonary effort is normal. No respiratory distress.      Breath sounds: No wheezing, rhonchi or rales.   Abdominal:      General: There is no distension.      Palpations: Abdomen is soft. There is no mass.      Tenderness: There is no abdominal tenderness. There is no guarding or rebound.   Musculoskeletal:         General: Normal range of motion.      Cervical back: Normal range of motion. No rigidity or tenderness.      Right lower leg: No edema.      Left lower leg: No edema.   Lymphadenopathy:      Cervical: No cervical adenopathy.   Skin:     General: Skin is warm and dry.      Coloration: Skin is pale. Skin is not jaundiced.      Findings: Rash present. No bruising or erythema.      Comments: Rash consistent with her pemphigus on her face abdomen and back and other places that were covered by her clothing   Neurological:      General: No focal deficit present.      Mental Status: She is alert and oriented to person, place, and time. Mental status is at baseline.      Motor: No weakness.      Gait: Gait normal.      Comments: Hard of hearing   Psychiatric:         Mood and Affect: Mood normal.         Behavior: Behavior normal.         Thought Content: Thought content normal.         Judgment:  Judgment normal.      Comments: She is cautious and she is in good spirits.           Current Laboratory Data  See narrative for laboratory data      Recent Imaging  No results found.      Pathology  Iron deficiency anemia among other problems contributing to her chronic fatigue. She also has significant renal dysfunction and multiple comorbidities.        Performance Status:  Symptomatic; fully ambulatory    Assessment/Plan    1.  Chronic fatigue.  She has many reasons for this.  She does have iron deficiency anemia and she also has chronic renal failure.  She also may have a component of the anemia of chronic disease related to her pemphigus.  She is depressed and has been living alone and is fairly isolated in her contacts.     With iron supplementation her blood count is somewhat better.  She is still iron deficient and needs to continue on oral iron.  She has been more socially active over the past month and she is forward thinking about gatherings that she is going to this weekend.  This is a substantial improvement in her affect.     Renal function is not as good as it has been.  I called her and told her to keep hydrated.  I think her kidney function test need to be repeated to make sure that they are improving.     2.  Iron deficiency.  Iron infusion has helped.  She needs to stay on oral iron as well.  She may need erythropoietin injections.  Erythropoietin level is low and she could probably benefit from injections that she is cautious because of the side effects.  She will consider this and let us know whether or not she wants to try injections or she wants to just be followed.     3.  Chronic renal failure.  She may need erythropoietin injections.  We are checking on her erythropoietin level today.   We will discuss the risks of this therapy including stroke, heart attack, deep vein thrombosis and hypertension.  She will follow with nephrology.     4.  History of hyerthyroidism.  She is on Tapazole.   She has a very prominent right side of her thyroid.  I did order a thyroid ultrasound which showed multiple nodules 1 of which was a T RADS very suspicious.  She has been seen by Dr. Meyers.  She may indeed need a biopsy.  She has a follow-up ultrasound coming.     5.  Multiple comorbidities.  These include but are not limited to:  History of gastrointestinal bleeding  Ibrahim's esophagus  CKD  COPD  Asthma  GERD  Hypertension  Depression  Pemphigus  Degenerative joint disease  Hyperlipidemia  Diabetes mellitus  Coronary artery disease status post myocardial infarction in the 1990s  Anxiety disorder  Psoriasis  Lung nodule  She is status post hysterectomy and lobectomy for low-grade carcinoid tumor 1.0 cm with negative margins and 1 positive lymph node.    Hypothyroidism  History of DVT in left leg around 2018 treated with anticoagulation.  Also had IVC filter placed at that time.  Insomnia     6.  Depression.  She is being treated for this with trazodone and I think that this diagnosis is a significant part of her chronic fatigue.     7.  History of deep vein thrombosis.  She continues on anticoagulation.  She has an IVC filter in place.     8.  Polyclonal gammopathy.  This is reactive rather than a primary problem and can be monitored.     9.  Pemphigus.  She has had this at least since 2017 at which time she had a biopsy.  This flares from time to time she has been hospitalized for complications of this process.  It seems to be flaring at this time.  I encouraged her to see her dermatologist.      She will consider the information has been given to her and  the benefits and side effects.  Will continue to monitor her closely.  She knows to call if she has any change in her status, questions or concerns.  We will see her again in 6 months or sooner as her symptoms dictate.        (This note was generated with voice recognition software and may contain errors including spelling, grammar, syntax and  misrecognition of what was dictated, that are not fully corrected)          Abbey Jerez MD

## 2024-09-25 NOTE — PATIENT INSTRUCTIONS
Reviewed labs and recent medical history.  Discussed her fatigue and her rash. Encouraged her to see her skin doctor, Dr. Mg.  Will plan to do labs today. Orders added.  Dr. Jerez will call patient with results.  Return to see MD in 6 months for follow up.

## 2024-09-26 ASSESSMENT — ENCOUNTER SYMPTOMS
NAUSEA: 0
PALPITATIONS: 0
NERVOUS/ANXIOUS: 0
WEAKNESS: 1
BRUISES/BLEEDS EASILY: 1
VOMITING: 0
UNEXPECTED WEIGHT CHANGE: 0
APPETITE CHANGE: 0
HEADACHES: 0
HEMATURIA: 1
RHINORRHEA: 0
SHORTNESS OF BREATH: 0
NUMBNESS: 0
MYALGIAS: 1
DYSURIA: 0
ABDOMINAL PAIN: 0
FATIGUE: 1
COUGH: 0
ARTHRALGIAS: 1
LIGHT-HEADEDNESS: 0
TROUBLE SWALLOWING: 0
DIARRHEA: 0
SLEEP DISTURBANCE: 0
CONSTIPATION: 0

## 2024-10-22 DIAGNOSIS — N18.4 CHRONIC KIDNEY DISEASE, STAGE 4 (SEVERE) (MULTI): Primary | ICD-10-CM

## 2024-10-24 ENCOUNTER — APPOINTMENT (OUTPATIENT)
Dept: GASTROENTEROLOGY | Facility: CLINIC | Age: 79
End: 2024-10-24
Payer: MEDICARE

## 2024-11-04 ENCOUNTER — LAB (OUTPATIENT)
Dept: LAB | Facility: LAB | Age: 79
End: 2024-11-04
Payer: MEDICARE

## 2024-11-04 DIAGNOSIS — N18.4 CHRONIC KIDNEY DISEASE, STAGE 4 (SEVERE) (MULTI): ICD-10-CM

## 2024-11-04 LAB
ALBUMIN SERPL BCP-MCNC: 3.3 G/DL (ref 3.4–5)
ANION GAP SERPL CALC-SCNC: 13 MMOL/L (ref 10–20)
BUN SERPL-MCNC: 35 MG/DL (ref 6–23)
CALCIUM SERPL-MCNC: 8.6 MG/DL (ref 8.6–10.3)
CHLORIDE SERPL-SCNC: 105 MMOL/L (ref 98–107)
CO2 SERPL-SCNC: 26 MMOL/L (ref 21–32)
CREAT SERPL-MCNC: 2.21 MG/DL (ref 0.5–1.05)
CREAT UR-MCNC: 78 MG/DL (ref 20–320)
EGFRCR SERPLBLD CKD-EPI 2021: 22 ML/MIN/1.73M*2
GLUCOSE SERPL-MCNC: 104 MG/DL (ref 74–99)
HCT VFR BLD AUTO: 32.6 % (ref 36–46)
HGB BLD-MCNC: 9.9 G/DL (ref 12–16)
PHOSPHATE SERPL-MCNC: 4.1 MG/DL (ref 2.5–4.9)
POTASSIUM SERPL-SCNC: 4.3 MMOL/L (ref 3.5–5.3)
PROT UR-ACNC: 15 MG/DL (ref 5–24)
PROT/CREAT UR: 0.19 MG/MG CREAT (ref 0–0.17)
PTH-INTACT SERPL-MCNC: 120.3 PG/ML (ref 18.5–88)
SODIUM SERPL-SCNC: 140 MMOL/L (ref 136–145)
URATE SERPL-MCNC: 5.2 MG/DL (ref 2.3–6.7)

## 2024-11-04 PROCEDURE — 85014 HEMATOCRIT: CPT

## 2024-11-04 PROCEDURE — 84156 ASSAY OF PROTEIN URINE: CPT

## 2024-11-04 PROCEDURE — 80069 RENAL FUNCTION PANEL: CPT

## 2024-11-04 PROCEDURE — 82570 ASSAY OF URINE CREATININE: CPT

## 2024-11-04 PROCEDURE — 84550 ASSAY OF BLOOD/URIC ACID: CPT

## 2024-11-04 PROCEDURE — 36415 COLL VENOUS BLD VENIPUNCTURE: CPT

## 2024-11-04 PROCEDURE — 83970 ASSAY OF PARATHORMONE: CPT

## 2024-11-04 PROCEDURE — 85018 HEMOGLOBIN: CPT

## 2024-11-09 NOTE — PROGRESS NOTES
PULMONARY PROGRESS NOTE    ADMISSION DATE:  10/30/2024  DATE:  11/8/2024  CURRENT HOSPITAL DAY:  Hospital Day: 10  ATTENDING PHYSICIAN:  Isela Richards MD  CODE STATUS:  Full Resuscitation      SUBJECTIVE:  Patient seen and examined. He is on 3L/min supplemental oxygen. He is fatigued appearing and curious about the next steps in management. Extensively reviewed patient's records including initial imaging, biopsy of YADI lesion, imaging from present admission. There has interval development of YADI opacities in region of known malignancy, increasing size of left pleural effusion. Patient notes his abdomen does not hurt. Symptoms of left sided pleuritic chest pain are most bothersome.     11/9 - Continues to wean down from 9L/min to 3L/min supplemental oxygen this AM. Labs with persistent hyponatremia, improving.  Overall feeling much better        HISTORIES:  Past Medical History:   Diagnosis Date    Back pain with radiation 08/07/2022    Chronic pain     COPD (chronic obstructive pulmonary disease)  (CMD)     Diabetes mellitus  (CMD)     Liver disease     liver mass    Lung cancer  (CMD)     Nuclear sclerosis     Oxygen dependent     3L n/s at night       has a past surgical history that includes back surgery (09/2022); bronchoscopy with fluro guide (02/2024); Liver biopsy; laparoscopy, cholecystectomy (06/29/2024); Esophagogastroduodenoscopy (EGD); ERCP; and removal gallbladder.    reports that he has been smoking cigarettes. He started smoking about 44 years ago. He has a 22.4 pack-year smoking history. He has been exposed to tobacco smoke. He has never used smokeless tobacco. He reports that he does not currently use drugs after having used the following drugs: Marijuana. He reports that he does not drink alcohol.     REVIEW OF SYSTEMS  Review of systems as per HPI, otherwise negative    MEDICATIONS:  SCHEDULED MEDS:  Current Facility-Administered Medications   Medication Dose Route Frequency Provider Last  April 17, 2023       Riccardo Avila MD  [address]  [place] [address]  Via In Basket      Patient: Arabella Hinds   YOB: 2014   Date of Visit: 4/17/2023       Dear Dr. Avila:    Thank you for referring Arabella Hinds to me for evaluation. Below are my notes for this visit with her.    If you have questions, please do not hesitate to call me. I look forward to following your patient along with you.      Sincerely,        Hermelinda Valero MD        CC: No Recipients  Hermelinda Valero MD  4/17/2023  2:44 PM  Signed  Pediatric Endocrinology Diabetes Visit Note    Interval History (HPI):  Arabella is a 8 year old 10 month old with Type 1 diabetes.    Date of diagnosis: 2017    Questions/Concerns:  none    CSII:  Pump/Pod: omnipod 5  Who gives boluses: patient does most of the injections or boluses   When do you give boluses: before meals and snacks , during meals and snacks  and after meals and snacks  How many boluses do you miss a week:0  Pump site locations:arms and thighs - has been trying higher/lower on both  Rotating pump sites: yes  Problems with pump sites or pump: No   Do you have edna to upload your pump to the cloud: No  Site change every 3 days  Active insulin time: 2.5 hours          Blood Glucose range/average: see attached log and/or CGM report    Glucose Monitoring    CGM: yes, Dexcom  Do you manually check BG? Yes when CGM requires calibration and/or is giving erroneous readings      Hypoglycemia:  Frequency per week (less than 70): 2  How many of those are overnight: 0  Hypoglycemia awareness: Yes  Hypoglycemia symptoms: Tremors   Glucagon at home and school: Yes  Use glucagon since last visit: No    Ketones:  Do you check for ketones: Checks  Ketones since last visit: negative  Method: urine ketone strips    Safety:   Hospitalizations since last visit: No  Medical Alert ID: Yes    Nutrition:   Do you eat regular meals: breakfast, lunch and dinner  Do you snack: afternoon  Labs drawn today- Dr Jerez will call pt with results  Pt set up for US thyroid 4/16 1245 arrival to [facility]  Rtc for MD visit 5/10 at 330  Reviewed AVS with patient- patient verbalizes understanding     Rate Last Admin    fluticasone-umeclidin-vilanterol (TRELEGY ELLIPTA) 200-62.5-25 MCG/ACT inhaler 1 puff  1 puff Inhalation Daily Resp Jersey Jones DO   1 puff at 11/08/24 0802    PARENTERAL NUTRITION - DIETITIAN/PHARMACIST MANAGED   Does not apply See Admin Instructions Linda Santiago MD        sodium chloride 0.9 % injection 10 mL  10 mL Injection 2 times per day Isela Richards MD   10 mL at 11/08/24 0850    fat emul fish oil/plant based 20% (SMOFLIPID) 250 mL infusion  250 mL Intravenous Q24H Linda Santiago MD   Completed at 11/08/24 1010    thiamine (VITAMIN B-1) injection 100 mg  100 mg Intravenous Daily Linda Santiago MD   100 mg at 11/08/24 0847    folic acid (FOLATE) injection 1 mg  1 mg Intravenous Daily Linda Santiago MD   1 mg at 11/08/24 0847    meropenem (MERREM) 1 g in sodium chloride 0.9 % 100 mL IVPB  1 g Intravenous 3 times per day Beau Shane MD 33.3 mL/hr at 11/08/24 1426 Rate Verify at 11/08/24 1426    micafungin (MYCAMINE) 100 mg in sodium chloride 0.9 % 100 mL IVPB  100 mg Intravenous Q24H Beau Shane  mL/hr at 11/08/24 1725 100 mg at 11/08/24 1725    [Held by provider] insulin glargine (LANTUS) injection 8 Units  8 Units Subcutaneous Daily Tobias Disla MD   8 Units at 11/04/24 0926    pantoprazole (PROTONIX INJECT) injection 40 mg  40 mg Intravenous 2 times per day Linda Santiago MD   40 mg at 11/08/24 0847    [Held by provider] magnesium oxide (MAG-OX) tablet 400 mg  400 mg Oral Daily Tobias Disla MD   400 mg at 11/04/24 0810    sodium chloride 0.9 % injection 2 mL  2 mL Intracatheter 2 times per day Courtney Alexander, CNP   2 mL at 11/08/24 0850    Potassium Standard Replacement Protocol (Levels 3.5 and lower)   Does not apply See Admin Instructions Courtney Alexander, CNP        Magnesium Standard Replacement Protocol   Does not apply See Admin Instructions Courtney Alexander, CNP        insulin lispro (ADMELOG,HumaLOG) - Correction Dose   Subcutaneous TID  and bedtime  Carb counting concerns: No    Other:   Job, sports or extracurricular activities: Yes, swimming, ice skating   Problems related to diabetes at school: No     PAST MEDICAL HISTORY:  Past Medical History:   Diagnosis Date   • Type 1 diabetes mellitus with hyperglycemia (CMD)    • Type 1 diabetes mellitus without complication (CMD) 1/17/2023       PAST SURGICAL HISTORY:  History reviewed. No pertinent surgical history.    FAMILY HISTORY:  History reviewed. No pertinent family history.    SOCIAL HISTORY:     Social History     Social History Narrative   • Not on file       Review of Systems   Constitutional: Negative for activity change and appetite change.   HENT: Negative for congestion and hearing loss.    Eyes: Negative for discharge and itching.   Respiratory: Negative for cough and wheezing.    Cardiovascular: Negative for chest pain and palpitations.   Gastrointestinal: Negative for abdominal pain, constipation and diarrhea.   Endocrine: Negative for cold intolerance, heat intolerance, polydipsia, polyphagia and polyuria.   Genitourinary: Negative for decreased urine volume and vaginal bleeding.   Musculoskeletal: Negative for gait problem.   Skin: Negative for rash.   Allergic/Immunologic: Negative for immunocompromised state.   Neurological: Negative for seizures and headaches.   Hematological: Does not bruise/bleed easily.   Psychiatric/Behavioral: Negative for behavioral problems.       PHYSICAL EXAMINATION  Visit Vitals  BP (!) 120/81 (BP Location: LUE - Left upper extremity, Patient Position: Sitting, Cuff Size: Regular)   Pulse 98   Temp 98.6 °F (37 °C) (Temporal)   Ht 5' 1.97\" (1.574 m)   Wt 50.8 kg (112 lb 1.7 oz)   SpO2 99%   BMI 20.52 kg/m²     93 %ile (Z= 1.45) based on CDC (Girls, 2-20 Years) weight-for-age data using vitals from 4/17/2023.  98 %ile (Z= 1.98) based on CDC (Girls, 2-20 Years) Stature-for-age data based on Stature recorded on 4/17/2023.  Body surface area is 1.49 meters  WC Depa, Courtney R, CNP   2 Units at 11/08/24 1704    insulin lispro (ADMELOG,HumaLOG) - Correction Dose   Subcutaneous Nightly Courtney Alexander CNP        heparin (porcine) injection 5,000 Units  5,000 Units Subcutaneous 3 times per day Tobias Disla MD   5,000 Units at 11/08/24 1301       CONTINUOUS INFUSIONS:  Current Facility-Administered Medications   Medication Dose Route Frequency Provider Last Rate Last Admin    parenteral nutrition adult custom central   Intravenous Continuous PN Linda Santiago MD        parenteral nutrition adult custom central   Intravenous Continuous PN Linda Santiago MD 83 mL/hr at 11/08/24 1426 Rate Verify at 11/08/24 1426    dextrose 10 % infusion  1,000 mL Intravenous Continuous PRN Linda Santiago MD           OBJECTIVE:  VITAL SIGNS:  Vital Last Value 24 Hour Range   Temperature 97.2 °F (36.2 °C) (11/08/24 1228) Temp  Min: 97.2 °F (36.2 °C)  Max: 97.9 °F (36.6 °C)   Pulse 78 (11/08/24 1228) Pulse  Min: 73  Max: 80   Respiratory 18 (11/08/24 1247) Resp  Min: 16  Max: 19   Non-Invasive  Blood Pressure 107/67 (11/08/24 1228) BP  Min: 107/67  Max: 117/78   Pulse Oximetry 94 % (11/08/24 1228) SpO2  Min: 92 %  Max: 100 %     Vital Today Admitted   Weight 76.4 kg (168 lb 6.9 oz) (11/08/24 0605) Weight: 79 kg (174 lb 2.6 oz) (10/30/24 2117)   Height N/A Height: 6' 5\" (195.6 cm) (10/30/24 2117)   BMI N/A BMI (Calculated): 20.65 (10/30/24 2117)       INTAKE/OUTPUT:  Negative fluid balance of 3,126 cc since admission           PHYSICAL EXAM:  General:  Awake, alert, pleasant, conversant, no distress, thin, chronically ill appearing  Eyes:  EOMI, conjunctivae/corneas clear.  HEENT: No oral lesions. Moist oral mucosa.   Neck: Supple, symmetrical.  Trachea midline.  Lungs: Diminished breath sounds in left lung base, nonlabored respirations, bilateral chest rise.  Heart: Regular rate, regular rhythm. Normal S1 and S2. No murmur, rub or gallop.  Abdomen:  Soft, nontender.  Bowel sounds  squared.  Blood pressure percentiles are 93 % systolic and 98 % diastolic based on the 2017 AAP Clinical Practice Guideline. This reading is in the Stage 1 hypertension range (BP >= 95th percentile).  BMI: 84 %ile (Z= 1.00) based on CDC (Girls, 2-20 Years) BMI-for-age based on BMI available as of 4/17/2023.    Physical Exam  Constitutional:       General: She is active.   HENT:      Head: Normocephalic and atraumatic.      Right Ear: External ear normal.      Left Ear: External ear normal.      Nose: Nose normal.      Mouth/Throat:      Mouth: Mucous membranes are moist.   Eyes:      Extraocular Movements: Extraocular movements intact.   Cardiovascular:      Rate and Rhythm: Normal rate and regular rhythm.      Pulses: Normal pulses.      Heart sounds: Normal heart sounds.   Pulmonary:      Effort: Pulmonary effort is normal.      Breath sounds: Normal breath sounds.   Abdominal:      General: Abdomen is flat.      Palpations: Abdomen is soft.   Musculoskeletal:         General: Normal range of motion.      Cervical back: Normal range of motion.   Skin:     General: Skin is warm and dry.      Capillary Refill: Capillary refill takes less than 2 seconds.      Comments: Mild Lipohypertrophy on legs, improved   Neurological:      General: No focal deficit present.      Mental Status: She is alert.   Psychiatric:         Mood and Affect: Mood normal.           Lab and Imaging Results  Hemoglobin A1C:   Results for orders placed or performed in visit on 04/17/23   POCT Glycohemoglobin Analyzer   Result Value Ref Range    Hemoglobin A1C, POC 6.6 (A) 4.5 - 5.6 %       Recent Results (from the past 8400 hour(s))   POCT Glycohemoglobin Analyzer    Collection Time: 07/27/22  3:44 PM   Result Value Ref Range    Hemoglobin A1C, POC 6.4 (A) 4.5 - 5.6 %   Free T4    Collection Time: 01/10/23  7:28 AM   Result Value Ref Range    T4, Free 1.3 0.8 - 1.4 ng/dL   Thyroid Stimulating Hormone    Collection Time: 01/10/23  7:28 AM    Result Value Ref Range    TSH 6.944 (H) 0.662 - 4.010 mcUnits/mL   Lipid Panel With Reflex    Collection Time: 01/10/23  7:28 AM   Result Value Ref Range    Fasting Status 12 0 - 999 Hours    Cholesterol 171 (H) <=169 mg/dL    Triglycerides 46 <=89 mg/dL    HDL 61 >=46 mg/dL     <=109 mg/dL    Non-HDL Cholesterol 110 <=119 mg/dL    Cholesterol/ HDL Ratio 2.8 <=4.4   Microalbumin Urine Random    Collection Time: 01/10/23  7:28 AM   Result Value Ref Range    Microalbumin, Urine 2.16 mg/dL    Creatinine, Urine 141.00 mg/dL    Microalbumin/ Creatinine Ratio 15.3 <30.0 mg/g   Celiac Disease Screen, 2y And Over    Collection Time: 01/10/23  7:28 AM   Result Value Ref Range    Tissue Transglutaminase Antibody, IgA 6 <20 Units    Immunoglobulin A 228 44 - 395 mg/dL   POCT Glycohemoglobin Analyzer    Collection Time: 01/17/23  3:29 PM   Result Value Ref Range    Hemoglobin A1C, POC 6.8 (A) 4.5 - 5.6 %   POCT Glycohemoglobin Analyzer    Collection Time: 04/17/23  2:11 PM   Result Value Ref Range    Hemoglobin A1C, POC 6.6 (A) 4.5 - 5.6 %       ASSESSMENT:   1. Type 1 diabetes mellitus with hyperglycemia (CMD)    2. Insulin pump in place    3. Uses self-applied continuous glucose monitoring device        CGM Report:      Education: Peds Endo Diabetes Education: correction factor and pump adjustment were discussed today.     PLAN:   Arabella has their diabetes in excellent control based off of the dexcom data and HbA1c. At today's visit we discussed hyperglycemia in the afternoons due to afternoon snack. I recommended a stronger carb ratio for afternoon snack. Of note she had an elevated TSH at her last blood draw. She had a common cold during that draw. Will plan to repeat summer 2023 (next appointment).    1.  Follow-up in pediatric endocrine clinic in 3 months  2.  Insulin changes and lab tests as planned at clinic visit per instructions.    3.  Monitor blood sugars pre-meals, pre-snack, bedtime and overnight. If on  present.  Extremities:  No clubbing, cyanosis. No LE edema.  Neurologic: Alert and oriented, nonfocal.    LABORATORY DATA:  Recent Results (from the past 24 hour(s))   GLUCOSE, BEDSIDE - POINT OF CARE    Collection Time: 11/07/24  9:25 PM    Specimen: Blood   Result Value Ref Range    GLUCOSE, BEDSIDE - POINT OF CARE 155 (H) 70 - 99 mg/dL   GLUCOSE, BEDSIDE - POINT OF CARE    Collection Time: 11/08/24 12:02 AM    Specimen: Blood   Result Value Ref Range    GLUCOSE, BEDSIDE - POINT OF CARE 178 (H) 70 - 99 mg/dL   GLUCOSE, BEDSIDE - POINT OF CARE    Collection Time: 11/08/24  6:07 AM    Specimen: Blood   Result Value Ref Range    GLUCOSE, BEDSIDE - POINT OF CARE 148 (H) 70 - 99 mg/dL   Magnesium    Collection Time: 11/08/24  6:31 AM    Specimen: Blood, Venous   Result Value Ref Range    Magnesium 2.0 1.7 - 2.4 mg/dL   Phosphorus    Collection Time: 11/08/24  6:31 AM    Specimen: Blood, Venous   Result Value Ref Range    Phosphorus 2.7 2.4 - 4.7 mg/dL   Basic Metabolic Panel    Collection Time: 11/08/24  6:31 AM    Specimen: Blood, Venous   Result Value Ref Range    Fasting Status      Sodium 132 (L) 135 - 145 mmol/L    Potassium 4.7 3.4 - 5.1 mmol/L    Chloride 98 97 - 110 mmol/L    Carbon Dioxide 31 21 - 32 mmol/L    Anion Gap 8 7 - 19 mmol/L    Glucose 145 (H) 70 - 99 mg/dL    BUN 14 6 - 20 mg/dL    Creatinine 0.30 (L) 0.67 - 1.17 mg/dL    Glomerular Filtration Rate >90 >=60    BUN/Cr 47 (H) 7 - 25    Calcium 8.0 (L) 8.4 - 10.2 mg/dL   CBC No Differential    Collection Time: 11/08/24  6:31 AM    Specimen: Blood, Venous   Result Value Ref Range    WBC 8.8 4.2 - 11.0 K/mcL    RBC 3.11 (L) 4.50 - 5.90 mil/mcL    HGB 10.0 (L) 13.0 - 17.0 g/dL    HCT 30.5 (L) 39.0 - 51.0 %    MCV 98.1 78.0 - 100.0 fl    MCH 32.2 26.0 - 34.0 pg    MCHC 32.8 32.0 - 36.5 g/dL     140 - 450 K/mcL    RDW-CV 22.7 (H) 11.0 - 15.0 %    RDW-SD 82.3 (H) 39.0 - 50.0 fL    NRBC 0 <=0 /100 WBC   GLUCOSE, BEDSIDE - POINT OF CARE    Collection  CGM check BG to calibrate and verify high or low blood sugars.   4.  Communicate blood sugar/ carbohydrate and insulin log to the office with instructions provided.   5. Screening Plan per the ADA Standards of Care:  Disease Recommendation Last Screen Results Next Screen Due   Thyroid Disease Soon after diagnosis and then every 1-2 years 1/2023 TSH 6 (ab neg) Summer 2023   Celiac Disease Soon after diagnosis then within 2 years and then 5 years  1/2023 wnl PRN   Hypertension At diagnosis and every clinic visit today wnl Next visit   Dyslipidemia Soon after diagnosis if >2 years old, then starting at age 9-11 years, repeat every 3 years if LDL <100 1/2023 wnl 1/2026   Nephropathy Puberty or >10 years and DM duration of 5 years 1/2023 wnl 1/2024   Retinopathy Puberty or >11 years and DM duration of 3-5 years   Summer 2023   Neuropathy Puberty of >10 years and DM duration of 5 years   Summer 2023         Goals for next visit: rotate injection sites        Hermelinda Valero MD            Time: 11/08/24 11:53 AM    Specimen: Blood   Result Value Ref Range    GLUCOSE, BEDSIDE - POINT OF CARE 155 (H) 70 - 99 mg/dL   GLUCOSE, BEDSIDE - POINT OF CARE    Collection Time: 11/08/24  5:01 PM    Specimen: Blood   Result Value Ref Range    GLUCOSE, BEDSIDE - POINT OF CARE 151 (H) 70 - 99 mg/dL        IMAGING STUDIES:  Results for orders placed or performed during the hospital encounter of 10/30/24 (from the past 72 hour(s))   XR CHEST AP OR PA    Impression    FINDINGS AND IMPRESSION: Right-sided port catheter tip projects over the  superior vena cava.    Stable cardiomediastinal silhouette. Stable ill-defined opacity in the left  upper lung with a punctate metallic density projected over the center as  characterized on recent CT chest. Generalized increased density of the left  hemithorax suggesting layering pleural effusion also seen on recent CT.  Lungs are otherwise grossly clear. No pneumothorax.    Electronically Signed by: VIKY ABARCA MD   Signed on: 11/7/2024 8:27 AM   Workstation ID: [id number]     PFT 12/4/2023    Procedure Note  PULMONARY FUNCTION TEST:     FVC 3.42 L (59%)  FEV1 1.25 L (29%)  FEV1/FVC 37%     There was a 16% improvement in the FEV1 with inhaled albuterol.     MVV 42 L (29%)     TLC 11.30 L (132%) RV 6.84 L (256%)     DLCO 14.42 ml/mmHg/min (43%)     O2 sat of 94% on room air  O2 sat of 87% walking on room air   O2 sat of 90% on 4 L/min with exertion     Total distance 575 m     IMPRESSION:  Abnormal PFT consistent with severe obstructive lung impairment  There is significant evidence of reversible bronchospasm  Increase in total lung capacity and residual volume consistent with hyperinflation and air trapping  Severe reduction in diffusing capacity  6-minute walk test as documented above  Patient requires 4 L of oxygen with exertion  Findings are consistent with severe end-stage COPD    IMPRESSION/PLAN:    1. Acute hypoxic respiratory failure requiring 9L/min at rest  2.  Metastatic squamous cell lung cancer of left upper lobe on carboplatin, pembrolizumab, abraxane (last 10/18/2024) involving liver, bone, GI system  3. Left pleural effusion, enlarging  4. Right lower lobe bronchiectasis  5. Sepsis due to pneumonia - bilateral infiltrates  6. Free abdominal air due to perforated viscus  # Severe COPD with air trapping, hyperinflation  # Chronic hypoxic respiratory failure on 4L/min with exertion    - Per general surgery, concern for metastatic gastric lesion or stress ulcer leading to gastric perforation, pneumoperitoneum. Recs noted for NPO, protonix bid, ID consult for antibiotics, TPN and holding steroids. Re-image with upper GI versus CT at later date.  - No need for bronch at this time  - Stop scheduled nebs. Resume triple inhaler therapy. Duonebs PRN. No corticosteroids due to possible gastric perforation. Monitor response to changes in therapies.  - Continue empiric antibiotics per ID - now on meropenem and micafungin  - Repeat CXR with improved pulmonary opacities on left compared to few days prior.  - Continue to wean FiO2 down to keep sats 88-95%. Improving hypoxemia.  - Pulmonary hygiene with: Acapella/flutter valve device to expectorate mucus, incentive spirometer to prevent atelectasis, mobilization with out of bed to chair three times daily as tolerated  - heparin held per surgery  -Follow-up imaging with contrast ordered for Sunday as noted by surgery  -Discussed with the patient  -Reviewed with the patient's nurse    Anderson Burleson MD, [facility]

## 2025-01-06 ENCOUNTER — APPOINTMENT (OUTPATIENT)
Dept: GASTROENTEROLOGY | Facility: CLINIC | Age: 80
End: 2025-01-06
Payer: MEDICARE

## 2025-01-21 ENCOUNTER — APPOINTMENT (OUTPATIENT)
Age: 80
End: 2025-01-21
Payer: MEDICARE

## 2025-02-24 DIAGNOSIS — I25.10 CORONARY ARTERY DISEASE, UNSPECIFIED VESSEL OR LESION TYPE, UNSPECIFIED WHETHER ANGINA PRESENT, UNSPECIFIED WHETHER NATIVE OR TRANSPLANTED HEART: ICD-10-CM

## 2025-02-24 DIAGNOSIS — I10 HTN (HYPERTENSION), BENIGN: ICD-10-CM

## 2025-02-24 DIAGNOSIS — E05.90 HYPERTHYROIDISM: ICD-10-CM

## 2025-02-24 DIAGNOSIS — M10.9 GOUT, UNSPECIFIED CAUSE, UNSPECIFIED CHRONICITY, UNSPECIFIED SITE: ICD-10-CM

## 2025-02-24 RX ORDER — POTASSIUM CHLORIDE 750 MG/1
10 TABLET, FILM COATED, EXTENDED RELEASE ORAL DAILY
Qty: 90 TABLET | Refills: 3 | Status: SHIPPED | OUTPATIENT
Start: 2025-02-24

## 2025-02-24 RX ORDER — METOPROLOL SUCCINATE 50 MG/1
50 TABLET, EXTENDED RELEASE ORAL DAILY
Qty: 90 TABLET | Refills: 3 | Status: SHIPPED | OUTPATIENT
Start: 2025-02-24

## 2025-02-24 RX ORDER — METHIMAZOLE 5 MG/1
5 TABLET ORAL DAILY
Qty: 90 TABLET | Refills: 3 | Status: SHIPPED | OUTPATIENT
Start: 2025-02-24

## 2025-02-24 RX ORDER — ALLOPURINOL 100 MG/1
100 TABLET ORAL DAILY
Qty: 90 TABLET | Refills: 3 | Status: SHIPPED | OUTPATIENT
Start: 2025-02-24

## 2025-02-24 RX ORDER — ROSUVASTATIN CALCIUM 20 MG/1
20 TABLET, COATED ORAL NIGHTLY
Qty: 90 TABLET | Refills: 3 | Status: SHIPPED | OUTPATIENT
Start: 2025-02-24

## 2025-03-26 ENCOUNTER — OFFICE VISIT (OUTPATIENT)
Dept: HEMATOLOGY/ONCOLOGY | Facility: CLINIC | Age: 80
End: 2025-03-26
Payer: MEDICARE

## 2025-03-26 VITALS
SYSTOLIC BLOOD PRESSURE: 107 MMHG | DIASTOLIC BLOOD PRESSURE: 68 MMHG | WEIGHT: 184.2 LBS | TEMPERATURE: 97.5 F | RESPIRATION RATE: 20 BRPM | HEART RATE: 90 BPM | BODY MASS INDEX: 30.88 KG/M2 | OXYGEN SATURATION: 98 %

## 2025-03-26 DIAGNOSIS — E04.1 THYROID NODULE: ICD-10-CM

## 2025-03-26 DIAGNOSIS — R53.82 CHRONIC FATIGUE: ICD-10-CM

## 2025-03-26 DIAGNOSIS — D47.2 GAMMOPATHY: ICD-10-CM

## 2025-03-26 DIAGNOSIS — D50.8 OTHER IRON DEFICIENCY ANEMIA: ICD-10-CM

## 2025-03-26 LAB
ALBUMIN SERPL BCP-MCNC: 3.1 G/DL (ref 3.4–5)
ALP SERPL-CCNC: 86 U/L (ref 33–136)
ALT SERPL W P-5'-P-CCNC: 7 U/L (ref 7–45)
ANION GAP SERPL CALC-SCNC: 11 MMOL/L (ref 10–20)
AST SERPL W P-5'-P-CCNC: 12 U/L (ref 9–39)
BASOPHILS # BLD AUTO: 0.04 X10*3/UL (ref 0–0.1)
BASOPHILS NFR BLD AUTO: 0.7 %
BILIRUB SERPL-MCNC: 0.3 MG/DL (ref 0–1.2)
BUN SERPL-MCNC: 28 MG/DL (ref 6–23)
CALCIUM SERPL-MCNC: 8.6 MG/DL (ref 8.6–10.3)
CHLORIDE SERPL-SCNC: 108 MMOL/L (ref 98–107)
CO2 SERPL-SCNC: 26 MMOL/L (ref 21–32)
CREAT SERPL-MCNC: 2.04 MG/DL (ref 0.5–1.05)
EGFRCR SERPLBLD CKD-EPI 2021: 24 ML/MIN/1.73M*2
EOSINOPHIL # BLD AUTO: 0.23 X10*3/UL (ref 0–0.4)
EOSINOPHIL NFR BLD AUTO: 3.8 %
ERYTHROCYTE [DISTWIDTH] IN BLOOD BY AUTOMATED COUNT: 13.9 % (ref 11.5–14.5)
GLUCOSE SERPL-MCNC: 101 MG/DL (ref 74–99)
HCT VFR BLD AUTO: 28.7 % (ref 36–46)
HGB BLD-MCNC: 8.9 G/DL (ref 12–16)
IMM GRANULOCYTES # BLD AUTO: 0.01 X10*3/UL (ref 0–0.5)
IMM GRANULOCYTES NFR BLD AUTO: 0.2 % (ref 0–0.9)
IRON SATN MFR SERPL: 18 % (ref 25–45)
IRON SERPL-MCNC: 40 UG/DL (ref 35–150)
LYMPHOCYTES # BLD AUTO: 1.12 X10*3/UL (ref 0.8–3)
LYMPHOCYTES NFR BLD AUTO: 18.5 %
MCH RBC QN AUTO: 28.3 PG (ref 26–34)
MCHC RBC AUTO-ENTMCNC: 31 G/DL (ref 32–36)
MCV RBC AUTO: 91 FL (ref 80–100)
MONOCYTES # BLD AUTO: 0.57 X10*3/UL (ref 0.05–0.8)
MONOCYTES NFR BLD AUTO: 9.4 %
NEUTROPHILS # BLD AUTO: 4.07 X10*3/UL (ref 1.6–5.5)
NEUTROPHILS NFR BLD AUTO: 67.4 %
NRBC BLD-RTO: 0 /100 WBCS (ref 0–0)
PLATELET # BLD AUTO: 229 X10*3/UL (ref 150–450)
POTASSIUM SERPL-SCNC: 4.8 MMOL/L (ref 3.5–5.3)
PROT SERPL-MCNC: 6.5 G/DL (ref 6.4–8.2)
RBC # BLD AUTO: 3.14 X10*6/UL (ref 4–5.2)
SODIUM SERPL-SCNC: 140 MMOL/L (ref 136–145)
TIBC SERPL-MCNC: 226 UG/DL (ref 240–445)
UIBC SERPL-MCNC: 186 UG/DL (ref 110–370)
WBC # BLD AUTO: 6 X10*3/UL (ref 4.4–11.3)

## 2025-03-26 PROCEDURE — 36415 COLL VENOUS BLD VENIPUNCTURE: CPT

## 2025-03-26 PROCEDURE — 80053 COMPREHEN METABOLIC PANEL: CPT | Performed by: INTERNAL MEDICINE

## 2025-03-26 PROCEDURE — 85025 COMPLETE CBC W/AUTO DIFF WBC: CPT | Performed by: INTERNAL MEDICINE

## 2025-03-26 PROCEDURE — 3074F SYST BP LT 130 MM HG: CPT | Performed by: INTERNAL MEDICINE

## 2025-03-26 PROCEDURE — 1159F MED LIST DOCD IN RCRD: CPT | Performed by: INTERNAL MEDICINE

## 2025-03-26 PROCEDURE — 99213 OFFICE O/P EST LOW 20 MIN: CPT | Performed by: INTERNAL MEDICINE

## 2025-03-26 PROCEDURE — 1126F AMNT PAIN NOTED NONE PRSNT: CPT | Performed by: INTERNAL MEDICINE

## 2025-03-26 PROCEDURE — 83550 IRON BINDING TEST: CPT | Performed by: INTERNAL MEDICINE

## 2025-03-26 PROCEDURE — 3078F DIAST BP <80 MM HG: CPT | Performed by: INTERNAL MEDICINE

## 2025-03-26 ASSESSMENT — ENCOUNTER SYMPTOMS
UNEXPECTED WEIGHT CHANGE: 0
LIGHT-HEADEDNESS: 0
NUMBNESS: 0
FATIGUE: 1
NERVOUS/ANXIOUS: 0
RHINORRHEA: 0
BRUISES/BLEEDS EASILY: 1
VOMITING: 0
NAUSEA: 0
HEADACHES: 0
COUGH: 0
SLEEP DISTURBANCE: 0
SHORTNESS OF BREATH: 0
TROUBLE SWALLOWING: 0
MYALGIAS: 1
DYSURIA: 0
CONSTIPATION: 0
APPETITE CHANGE: 0
DIARRHEA: 0
PALPITATIONS: 0
ARTHRALGIAS: 1
WEAKNESS: 1
HEMATURIA: 1
ABDOMINAL PAIN: 0

## 2025-03-26 ASSESSMENT — PAIN SCALES - GENERAL: PAINLEVEL_OUTOF10: 0-NO PAIN

## 2025-03-26 NOTE — PROGRESS NOTES
Patient ID:Hailee David is a 80 y.o. year old female patient with chronic fatigue and anemia    Referring Physician: Abbey Jerez MD  [address] [place] [address] [address]  [place] [id number]  Primary Care Provider: Devon Lewis MD    Chief Complaint  Chief Complaint   Patient presents with    Gammopathy          History of the Present Illness  9/11/2018.  Seen by Dr. Newell from gastroenterology for iron deficiency anemia.  Hemoglobin had dropped to 7.9.  EGD and colonoscopy showed large hiatal hernia with Ibrahim's esophagus and colonoscopy showed no polyps.  Multiple biopsies have been taken and showed no dysplasia.  She also had benign polyps of the fundus of the stomach.  She was anticoagulated at that time.  Her weight at these visits was 223 pounds.     10/15/2018.  Echocardiogram showed normal left ventricular ejection fraction of 55 to 60% with no significant valvular heart disease.  The study was done because of dyspnea.     6/21/2021.  CBC showed white count 8.0 with a hemoglobin of 8.9, hematocrit 28.4 with an MCV of 86 MCHC of 31.2 and a platelet count 357,000.  Her RDW was 16.2.     8/16/2019.  Seen by Dr. Aba Carias.  He noted the patient had DVT and gastrointestinal bleeding in June 2018.  This required transfusion of several units of blood.  IVC filter was placed at that time.  Ultrasound done on 8/5/2019 showed partially occlusive chronic deep vein thrombosis of the left common femoral veins down to the popliteal vein.  This is consistent with old clot/scar tissue.  He said that she should be kept on this indefinitely, because there was an unprovoked clot.  There was further testing for thrombophilia.     10/8/2019.  BUN was 30 creatinine was 2.31 uric acid was 8.1.  PTH was 208.5.  Hemoglobin was 9.9 hematocrit was 31.6.     11/11/2019 through 11/14/2019.  Hospitalized with shortness of breath with a working diagnosis of acute exacerbation of COPD.  She was treated with systemic steroids  medication nebulizer treatments and respiratory support.  Regular medications including Lasix were continued she did not receive antibiotics.  CBC showed a white count of 9.2 with hemoglobin 10 hematocrit 31.6 and platelet count of 262,000.  BUN had gone up to 40 with a creatinine of 3.18 and peaked at 60/2.53 on 11/14/2019     2/15/2020 through 2/20/2020.  Admitted to the hospital with dizziness, recurrent falls and bilateral knee pain.  At that time she had acute kidney injury and hyperkalemia on the basis of orthostatic hypotension and dehydration.  Nephrology was consulted.  BUN was 28 and creatinine was 4.48.  CBC showed a white count 11,000 with a hemoglobin 11.3 hematocrit 36.1 and a platelet count of 364,000.  By the time of discharge her BUN was 20 and creatinine was 2.11.  Hemoglobin was 9.3 hematocrit 29.7.     3/12/2020.  BUN is 22 creatinine is 2.24.  CBC showed white count 10.4 with a hemoglobin 9.7 hematocrit 30.4 and platelet count 338,000.     2/15/2021.  BUN was 29 creatinine was 2.9.  6/21/2021.  BUN was 27 creatinine was 2.36.  Hemoglobin was 8.9 with hematocrit of 28.4     8/2/2021.  Referred to Dr. Khalil for anemia by Dr. Lewis.  She had had a history of unprovoked bilateral left greater than right lower extremity deep vein thrombosis diagnosed in June 2018.  She had an IVC filter placed and she was treated with Eliquis without any recurrence of bleeding.  She was referred for anemia which he felt was chronic with a hemoglobin ranging from 8-9 with an MCV of 86.  White blood count and platelet counts have been normal.  Her creatinine was significantly elevated at 2.36.  He noted that previous laboratory data in August 2020 showed a ferritin of 37 with a saturation of 12%.  Her B12 level was low normal at 244.  She was treated with oral iron which she tolerated well.  Folate was normal, haptoglobin was elevated, total protein was normal, LDH was normal     Her other complaints included low  "energy.  She says that she would fall asleep in the middle of the day.  She had no signs of infection.  She does have arthritic pain.     His assessment was that she had the anemia of chronic disease but also had iron deficiency we plan to recheck a CBC and iron parameters and give IV iron before consideration of JAVIER treatment.  He discussed the risks and benefits of erythropoietin particularly the cardiovascular risks of stroke and myocardial infarction as well as venous thromboembolic risk.  He also plan to reevaluate her for monoclonal gammopathy.  In reference to her unprovoked bilateral deep vein thrombosis, the plan was to keep her on anticoagulation indefinitely.     9/30/2021 through 10/6/2021.  Admitted to the hospital with cellulitis.  She had a history of pemphigus and presented to the emergency room with fevers, chills, confusion and was found to be septic with lactic acidosis, acute on chronic renal failure, acute metabolic encephalopathy and her hypothyroidism.  She was described to have \"severe angry erosive rash in both groin areas.  By a whitish-cream.  The rash was erythematous and tender to touch.  She was treated with broad-spectrum antibiotics, IV fluids and her Lasix was held.  Stool for occult blood was negative.  Hemoglobins were in the sevens with hematocrit in the low 20s.  White count and platelet count are normal.     10/28/2021.  CBC showed white count 10.2 with hemoglobin 10.5 hematocrit 33.7 and a platelet count of 314,000 with 71 polys 17 lymphs 9 monos and 1 eosinophil.     7/3/2022 through 7/7/2022.  Readmitted to the hospital with acute kidney injury superimposed on chronic kidney disease and urinary tract infection.  She was stabilized and able to be discharged home.  During that hospital stay her hemoglobin dropped again to 7.9 with hematocrit of 24.6.     5/8/2023.  Follow-up with Dr. Lewis.  Patient said that she was up most nights despite taking mirtazapine and trazodone.  " Subsequently mirtazapine was discontinued and trazodone was increased to 200 mg p.o. nightly.  Anemia was more problematic and she was referred back to hematology, but did not make that appointment.  Weight at that time was 175 pounds.     7/3/2023.  CBC showed a white count of 7000 hemoglobin 9.2 hematocrit 29.1 with a platelet count of 284,000 with a normal differential.     3/13/2024.  CBC showed a white count of 6.7 with hemoglobin 8.9 hematocrit 28.6 and a platelet count of 280,000.  Serum chemistry showed a BUN of 38 and creatinine of 2.33 and albumin of 3.3 and normal liver function test.  Her EGFR was down to 21.  TSH was normal.  Uric acid was normal.  Parathyroid hormone level was elevated at 104.9.     3/14/2024.  Laboratory data showed an albumin of 3.0.  She had polyclonal gammopathy consistent with chronic inflammation.     3/18/2024.  Follow-up with Dr. Lewis.  She told her physician that her left breast was larger than the right and sometimes she had pain but there is no mass.  She has occasional dyspepsia.  He referred her to us because of the previous diagnosis of polyclonal gammopathy.  Her weight was down to 171 pounds.     3/27/2024.  Mammogram no evidence of malignancy.  She was said to be heterogeneously dense discrete masses or suspicious microcalcifications.     4/12/2024.  She is here today to reestablish medical oncology/hematology care.  She says that she feels tired all the time.  She has felt this way for a couple years.  She says that she feels better if she is more active.  She says that her daughter-in-law is in charge of her medications.  She has discomfort everywhere which she attributes to arthritis.  She has not resumed smoking.  She says that she has been abstinent since she had a myocardial infarction when she was 55 years old.  She has had no recent bleeding.  She said that she takes 100 mg of trazodone nightly because the 200 mg pill made her feel weird.  She says that she  does not get a lot of exercise.  She walks back and forth to the bathroom.  She lives alone in a two-story only stays on the lower level.  She says that she does not talk to people daily and she does not have a Judaism family and she does not have any pets.  Her current weight is 172 pounds 6.4 ounces and her last weight was 171 pounds 8 ounces.  When she was weighed here in 2021 she weighed 182.     4/16/2024.  Thyroid ultrasound showed enlargement of the right lobe compared to the left with at least 5 nodules.  The nodules identified as #2 was 2.2 x 2.1 x 1.8 cm and was almost completely solid.  It was a T RADS 5 which was highly suspicious.  Nodule 1 was said to be T RADS 3 mildly suspicious.  Nodule 3 was T RADS 3 mildly suspicious.  Nodule for was T RADS 4 moderately suspicious.  Nodule 5 was T RADS 3 mildly suspicious.     4/18/2024.  Follow-up with Dr. Lewis will refer her to Dr. Meyers.  He noted that the serum creatinine electrophoresis showed polyclonal gammopathy consistent with inflammation probably related to her known pemphigus.  He treated him 1/2 cm ribbon of epidermal disruption with a course of doxycycline.  He continued her on lifelong Eliquis because of previous DVT.  She also noted her depression for which she was on trazodone.     4/30/2024.  Started on iron infusions with Venofer which she tolerated well.     5/14/2024.  Seen by Queenie Senior from nutrition services who noted that the patient had poor nutritional intake due to the difficulty cooking with back pain.  He offered her Meals on Wheels but the patient declined for now.  Significant weight loss over time was noted and serial weights.  Current weight was 171 pounds.     5/21/2024.  Seen by Dr. Meyers who did not feel any masses and felt that the ultrasound needed to be repeated in a year.     6/7/2024.  She is here in the office for review of her recent events.  We discussed Dr. Meyers's evaluation.  Repeat ultrasound is scheduled.  She  tolerated the iron and feels a little bit better.  She is getting out more and she says that her view of the world is better.  She is looking forward to a graduation party this weekend.     Laboratory data drawn today shows BUN and creatinine are higher than they usually are at and 2.48.  Sugar was 152.  Liver function tests were normal.  Her iron saturation has improved but still is 7 normal.  Her CBC has improved with a white count 7.8, hemoglobin 9.8, up from 9.0 and hematocrit is 32.2 up from 28.  Platelets are normal.  White blood cell differential is normal.     6/26/2024.  She is here to discuss the possibility of erythropoietin injections.  We mentioned this on her last visit.  I wanted her to consider it further and I have placed several phone calls to her but got no response.  Her laboratory data continues to show an elevated BUN and creatinine.  Her iron saturation has markedly that is still not yet normal.  Her serum iron is normal.  Encouraged her to stay on oral iron.  She is still anemic but is somewhat better with a hemoglobin of 9.8 and a hematocrit of 32.2.     When we discussed the possible side effects of erythropoietin injections, she was cautious.  She did not completely rule out erythropoietin injections but wants to consider it and see if she really needs it.  Right now she is feeling severely improved with the iron infusions.    7/8/2024.  Thyroid ultrasound    7/23/2024.  Follow-up with Dr. Lewis who referred her to GI for her Ibrahim's esophagus follow-up.    9/25/2024.  She is here for routine follow-up.  She says that her energy level is okay.  She does not do much.  She tends to be solitary.  She says that her allergies have been acting up.  She has had a lot of watery itchy eyes.  I recommended that she use an over-the-counter allergy pill.  She says that she has done this in the past.  Her appetite has not been very good but she has gained a couple pounds the last time she was here.   She says that she has headaches when she gets a urinary tract infection.  She has had no bleeding she has had no infections.    Laboratory data that was drawn today showed slight improvement in her creatinine from 2.48 now at 2.14 with an EGFR going from 19-23.  Liver function tests were normal.  Her iron saturation is about the same at 21%.  Hemoglobin is now 10.1 up from 9.8 and hematocrit is 32.6 up from 32.2.  Platelet count is normal at 246 and white blood cell differential is normal.    I encouraged her to be more active she has friends and family.  She notes that she feels better when she is more connected.  She will return to the office in about 6 months.  She knows to call in the interim should she have any change.    Interval Note  11/4/2024.  Laboratory data showed a BUN of 35 and a creatinine of 2.21.  Uric acid was 5.2.  Hemoglobin is 9.9 with hematocrit 42.6.  PTH was 120.3, was normal being 18.5-88.  The total protein to creatinine ratio was slightly elevated at 0.19 with the upper limits of normal being 0.17.    3/26/2025.  She is here today in a 6-month follow-up.  She says that she is about the same as she has been.  She does not get out of the house much.  She sleeps from about 12 midnight to 8 AM and naps for several hours during the day.  She seems to be happy with this sedentary lifestyle.  She is looking forward to the spring.  She has had no bleeding.  She has had no new pains or shortness of breath.  She has had no falls    It appears that she saw her nephrologist in November but canceled her appointment to see GI and primary care.  She says that she has been battling her pemphigus and outlets takes a lot of time and energy.  The lesions have had some bleeding.  She says that her appetite is too good.  She currently weighs 184 pounds.  On her last visit she weighed 176 for 7 pound weight gain.  She does not seem as depressed as she was on previous visits.    She still has a chronic fatigue.   We told her that we are going to check her iron stores once again.  The iron infusion did help previously.  She says that she stayed on her oral iron.  She has had no problems with nausea or vomiting.    Comorbidities  History of gastrointestinal bleeding  Ibrahim's esophagus  CKD felt to be secondary to diabetic nephropathy with hypertensive nephrosclerosis, followed by Dr. Dove  COPD  Asthma  GERD  Hypertension  Depression  Pemphigus  Degenerative joint disease  Hyperlipidemia  Diabetes mellitus  Coronary artery disease status post myocardial infarction in the 1990s, with left heart cath 7/17/2014.  Anxiety disorder  Psoriasis  Lung nodule  She is status post hysterectomy and lobectomy for low-grade carcinoid tumor 1.0 cm with negative margins and 1 positive lymph node.    Hypothyroidism  History of DVT in left leg around 2018 treated with anticoagulation.  Also had IVC filter placed at that time.  Insomnia    Monitoring Parameters  Histories and physical examinations, CBCs and iron studies     Review of Systems - Oncology   Review of Systems   Constitutional:  Positive for fatigue. Negative for appetite change and unexpected weight change.   HENT:  Positive for mouth sores. Negative for dental problem, rhinorrhea and trouble swallowing.    Eyes:  Negative for visual disturbance.   Respiratory:  Negative for cough and shortness of breath (gets winded sometimes).    Cardiovascular:  Negative for palpitations and leg swelling.   Gastrointestinal:  Negative for abdominal pain, constipation, diarrhea, nausea and vomiting.   Endocrine: Negative for polyuria.   Genitourinary:  Positive for hematuria. Negative for dysuria and urgency.   Musculoskeletal:  Positive for arthralgias and myalgias.   Skin:  Positive for rash. Negative for pallor.   Neurological:  Positive for weakness. Negative for light-headedness, numbness and headaches.   Hematological:  Bruises/bleeds easily.   Psychiatric/Behavioral:  Negative for  self-injury, sleep disturbance and suicidal ideas. The patient is not nervous/anxious.         Past Medical History  Past Medical History:   Diagnosis Date    Encounter for screening for malignant neoplasm of vagina     Vaginal Pap smear    Old myocardial infarction     History of myocardial infarction    Personal history of other endocrine, nutritional and metabolic disease 06/22/2020    History of diabetes mellitus    Personal history of other medical treatment     H/O mammogram        Surgical History  Past Surgical History:   Procedure Laterality Date    CARDIAC CATHETERIZATION  06/25/2018    Cardiac Cath Procedure Outcome:    HYSTERECTOMY  06/25/2018    Hysterectomy    OTHER SURGICAL HISTORY  06/25/2018    Uterine Surgery    OTHER SURGICAL HISTORY  06/25/2018    IVC Filter Placement W/ Fluorosc Angiogr Guidance W/ IV Contrast    OTHER SURGICAL HISTORY  11/21/2019    Exploratory laparotomy    OTHER SURGICAL HISTORY  09/20/2019    Dilation and curettage    OTHER SURGICAL HISTORY  11/21/2019    Breast surgery    OTHER SURGICAL HISTORY  11/21/2019    Colonoscopy    OTHER SURGICAL HISTORY  11/21/2019    Lung lobectomy       Social History  Social History     Tobacco Use    Smoking status: Never    Smokeless tobacco: Never   Vaping Use    Vaping status: Never Used   Substance Use Topics    Alcohol use: Never    Drug use: Defer        Family History  family history includes Breast cancer (age of onset: 70) in her sister; CVA in her mother; Heart attack in her father; Heart disease in her mother; Hypertension in her brother and sister; Lung cancer in her brother.       Current Medications  Current Outpatient Medications   Medication Instructions    allopurinol (ZYLOPRIM) 100 mg, oral, Daily    aspirin 81 mg, Daily    betamethasone (augmented) (DIPROLENE) 0.05 %, 2 times daily    calcitriol (ROCALTROL) 0.25 mcg, oral, Daily    cholecalciferol (Vitamin D-3) 5,000 Units tablet 1 tablet, Daily    Eliquis 2.5 mg, oral, 2  times daily    ferrous sulfate 325 (65 Fe) MG tablet 1 tablet, Daily    furosemide (LASIX) 40 mg, oral, Daily    methIMAzole (TAPAZOLE) 5 mg, oral, Daily    metoprolol succinate XL (TOPROL-XL) 50 mg, oral, Daily    pantoprazole (PROTONIX) 20 mg, oral, Daily    potassium chloride CR 10 mEq ER tablet 10 mEq, oral, Daily    rosuvastatin (CRESTOR) 20 mg, oral, Nightly    traZODone (DESYREL) 100 mg, oral, Nightly    Ventolin HFA 90 mcg/actuation inhaler 2 puffs, Every 6 hours PRN           OARRS Review  I have personally reviewed the OARRS report for Hailee David. I have considered the risks of abuse, dependence, addiction and diversion.  There are no prescriptions for sedatives stimulants or opioids    Vital Signs  /68   Pulse 90   Temp 36.4 °C (97.5 °F) (Skin)   Resp 20   Wt 83.6 kg (184 lb 3.2 oz)   SpO2 98%   BMI 30.88 kg/m²      Physical Exam  Vitals and nursing note reviewed. Exam conducted with a chaperone present.   Constitutional:       General: She is not in acute distress.     Appearance: She is ill-appearing. She is not toxic-appearing.      Comments: She is a well-developed well-nourished, somewhat pale elderly  female who is unaccompanied.  She has a more positive affect than she had in the past.  She is smiling.   HENT:      Head: Normocephalic and atraumatic.      Nose: Nose normal.      Mouth/Throat:      Mouth: Mucous membranes are moist.      Pharynx: Oropharynx is clear. No oropharyngeal exudate or posterior oropharyngeal erythema.      Comments: She has lesions in her mouth which she says are consistent with pemphigus.  She also has it in her nose.  Eyes:      General: No scleral icterus.     Extraocular Movements: Extraocular movements intact.      Conjunctiva/sclera: Conjunctivae normal.      Pupils: Pupils are equal, round, and reactive to light.      Comments: Prominent eyes related to her thyroid disease   Neck:      Comments: There is no significant lymphadenopathy in  the head neck region, supraclavicular or axillary areas bilaterally.  Cardiovascular:      Rate and Rhythm: Regular rhythm. Tachycardia present.      Heart sounds: No murmur heard.  Pulmonary:      Effort: Pulmonary effort is normal. No respiratory distress.      Breath sounds: No wheezing, rhonchi or rales.   Abdominal:      General: There is no distension.      Palpations: Abdomen is soft. There is no mass.      Tenderness: There is no abdominal tenderness. There is no guarding or rebound.   Musculoskeletal:         General: Normal range of motion.      Cervical back: Normal range of motion. No rigidity or tenderness.      Right lower leg: No edema.      Left lower leg: No edema.   Lymphadenopathy:      Cervical: No cervical adenopathy.   Skin:     General: Skin is warm and dry.      Coloration: Skin is pale. Skin is not jaundiced.      Findings: Rash present. No bruising or erythema.      Comments: Rash consistent with her pemphigus on her face abdomen and back and other places that were covered by her clothing   Neurological:      General: No focal deficit present.      Mental Status: She is alert and oriented to person, place, and time. Mental status is at baseline.      Cranial Nerves: Cranial nerve deficit present.      Motor: No weakness.      Gait: Gait normal.      Comments: Hard of hearing   Psychiatric:         Mood and Affect: Mood normal.         Behavior: Behavior normal.         Thought Content: Thought content normal.         Judgment: Judgment normal.      Comments: she is in good spirits.           Current Laboratory Data  CBC done today shows white count 6.0 hemoglobin of 8.9 hematocrit 28.7 with a platelet count of 229,000.  White blood cell differential count showed 67% polys, 18 lymphs, 9 monos, 4 eosinophils.  Serum chemistries are normal with the exception of a sugar of 101 chloride of 108 BUN of 28 creatinine of 2.04.  Her EGFR is 24.  Her albumin is 3.1.  Liver function tests are normal.   Her iron is 40 which is normal but her TIBC is low at 226 and iron saturation is only 18% down from 21%.      Recent Imaging  No results found.      Pathology  Iron deficiency anemia among other problems contributing to her chronic fatigue. She also has significant renal dysfunction and multiple comorbidities.        Performance Status:  Symptomatic; fully ambulatory    Assessment/Plan    1.  Chronic fatigue.  She has many reasons for this.  She does have iron deficiency anemia and she also has chronic renal failure.  She also may have a component of the anemia of chronic disease related to her pemphigus.  She is depressed and has been living alone and is fairly isolated in her contacts.  She does not seem to want to change any of these things.     With iron supplementation her blood count is somewhat better.  She is still iron deficient and needs to continue on oral iron.  We will offer her another iron infusion.        2.  Iron deficiency.  Iron infusion has helped.  She said that she stays on her oral iron but she is still iron deficient she may need to have another iron infusion.    3.  Chronic renal failure.  She follows with Dr. Dove.    4.  History of hyerthyroidism.  She is on Tapazole.  She has a very prominent right side of her thyroid.  I did order a thyroid ultrasound which showed multiple nodules 1 of which was a T RADS very suspicious.  She has been seen by Dr. Meyers.  She may indeed need a biopsy.  She has a follow-up ultrasound coming.     5.  Multiple comorbidities.  These include but are not limited to:  History of gastrointestinal bleeding  Ibrahim's esophagus  CKD  COPD  Asthma  GERD  Hypertension  Depression  Pemphigus  Degenerative joint disease  Hyperlipidemia  Diabetes mellitus  Coronary artery disease status post myocardial infarction in the 1990s  Anxiety disorder  Psoriasis  Lung nodule  She is status post hysterectomy and lobectomy for low-grade carcinoid tumor 1.0 cm with negative  margins and 1 positive lymph node.    Hypothyroidism  History of DVT in left leg around 2018 treated with anticoagulation.  Also had IVC filter placed at that time.  Insomnia     6.  Depression.  She is being treated for this with trazodone and I think that this diagnosis is a significant part of her chronic fatigue.  She does not want to change this.     7.  History of deep vein thrombosis.  She continues on anticoagulation.  She has an IVC filter in place.     8.  Polyclonal gammopathy.  This is reactive rather than a primary problem and can be monitored.     9.  Pemphigus.  She has had this at least since 2017 at which time she had a biopsy.  This flares from time to time she has been hospitalized for complications of this process.  It seems to be flaring at this time.  I encouraged her to see her dermatologist.      She will consider the information has been given to her and  the benefits and side effects.  Will continue to monitor her closely.  She knows to call if she has any change in her status, questions or concerns.  We will have her return in a year.     (This note was generated with voice recognition software and may contain errors including spelling, grammar, syntax and misrecognition of what was dictated, that are not fully corrected)          Abbey Jerez MD

## 2025-03-26 NOTE — PATIENT INSTRUCTIONS
Reviewed labs and recent medical history.  Discussed her appetite and encouraged her to keep eating a diet with fruits and vegetables and protein.  Will plan to check labs on her today. Orders added.  Dr. Jerez will call her with the results. She will let her know whether we need to see her again sooner.  Return to see MD in 1 year for follow up.

## 2025-03-26 NOTE — PROGRESS NOTES
Pt with labs today.  Pt will follow up in one year.  Dr. Jerez will call with results.  Pt given AVS and voices understanding.

## 2025-06-19 DIAGNOSIS — J44.89 COPD WITH ASTHMA (MULTI): Primary | ICD-10-CM

## 2025-06-19 RX ORDER — ALBUTEROL SULFATE 90 UG/1
2 AEROSOL, METERED RESPIRATORY (INHALATION) EVERY 6 HOURS PRN
Qty: 18 G | Refills: 11 | Status: SHIPPED | OUTPATIENT
Start: 2025-06-19

## 2025-06-23 DIAGNOSIS — F51.01 PRIMARY INSOMNIA: ICD-10-CM

## 2025-06-23 RX ORDER — TRAZODONE HYDROCHLORIDE 100 MG/1
100 TABLET ORAL NIGHTLY
Qty: 90 TABLET | Refills: 3 | Status: SHIPPED | OUTPATIENT
Start: 2025-06-23 | End: 2026-06-23

## 2025-06-30 DIAGNOSIS — I82.5Y2 CHRONIC DEEP VEIN THROMBOSIS (DVT) OF PROXIMAL VEIN OF LEFT LOWER EXTREMITY: ICD-10-CM

## 2025-06-30 RX ORDER — APIXABAN 2.5 MG/1
2.5 TABLET, FILM COATED ORAL 2 TIMES DAILY
Qty: 180 TABLET | Refills: 3 | Status: SHIPPED | OUTPATIENT
Start: 2025-06-30

## 2025-08-14 ENCOUNTER — TELEPHONE (OUTPATIENT)
Dept: PRIMARY CARE | Facility: CLINIC | Age: 80
End: 2025-08-14
Payer: MEDICARE

## 2025-08-14 DIAGNOSIS — I82.5Y2 CHRONIC DEEP VEIN THROMBOSIS (DVT) OF PROXIMAL VEIN OF LEFT LOWER EXTREMITY: ICD-10-CM

## 2025-08-14 DIAGNOSIS — E05.90 HYPERTHYROIDISM: Primary | ICD-10-CM

## 2025-08-14 DIAGNOSIS — Z00.00 ROUTINE GENERAL MEDICAL EXAMINATION AT A HEALTH CARE FACILITY: ICD-10-CM

## 2025-08-14 RX ORDER — ACETAMINOPHEN 500 MG
125 TABLET ORAL DAILY
Qty: 90 TABLET | Refills: 3 | Status: SHIPPED | OUTPATIENT
Start: 2025-08-14

## 2025-08-14 RX ORDER — APIXABAN 2.5 MG/1
2.5 TABLET, FILM COATED ORAL 2 TIMES DAILY
Qty: 180 TABLET | Refills: 3 | Status: SHIPPED | OUTPATIENT
Start: 2025-08-14

## 2025-08-14 RX ORDER — FUROSEMIDE 40 MG/1
40 TABLET ORAL DAILY
Qty: 90 TABLET | Refills: 3 | Status: SHIPPED | OUTPATIENT
Start: 2025-08-14 | End: 2026-08-14

## 2025-08-18 DIAGNOSIS — Z00.00 ROUTINE GENERAL MEDICAL EXAMINATION AT A HEALTH CARE FACILITY: ICD-10-CM

## 2025-08-18 RX ORDER — CALCITRIOL 0.25 UG/1
0.25 CAPSULE ORAL DAILY
Qty: 90 CAPSULE | Refills: 3 | Status: SHIPPED | OUTPATIENT
Start: 2025-08-18 | End: 2026-08-18